# Patient Record
Sex: FEMALE | Race: OTHER | Employment: FULL TIME | URBAN - METROPOLITAN AREA
[De-identification: names, ages, dates, MRNs, and addresses within clinical notes are randomized per-mention and may not be internally consistent; named-entity substitution may affect disease eponyms.]

---

## 2019-12-15 ENCOUNTER — HOSPITAL ENCOUNTER (EMERGENCY)
Age: 29
Discharge: HOME | End: 2019-12-15

## 2019-12-15 VITALS
SYSTOLIC BLOOD PRESSURE: 94 MMHG | TEMPERATURE: 98.3 F | DIASTOLIC BLOOD PRESSURE: 52 MMHG | BODY MASS INDEX: 24.55 KG/M2 | HEIGHT: 61 IN | HEART RATE: 76 BPM | OXYGEN SATURATION: 100 % | WEIGHT: 130 LBS | RESPIRATION RATE: 18 BRPM

## 2019-12-15 DIAGNOSIS — H66.90 ACUTE OTITIS MEDIA, UNSPECIFIED OTITIS MEDIA TYPE: ICD-10-CM

## 2019-12-15 DIAGNOSIS — H20.9 IRITIS OF RIGHT EYE: Primary | ICD-10-CM

## 2019-12-15 PROCEDURE — 99283 EMERGENCY DEPT VISIT LOW MDM: CPT

## 2019-12-15 RX ORDER — TOBRAMYCIN AND DEXAMETHASONE 3; 1 MG/ML; MG/ML
1 SUSPENSION/ DROPS OPHTHALMIC
Qty: 5 ML | Refills: 0 | Status: SHIPPED | OUTPATIENT
Start: 2019-12-15 | End: 2019-12-25

## 2019-12-15 RX ORDER — AMOXICILLIN 500 MG/1
500 CAPSULE ORAL 2 TIMES DAILY
Qty: 14 CAPSULE | Refills: 0 | Status: SHIPPED | OUTPATIENT
Start: 2019-12-15 | End: 2019-12-22

## 2019-12-15 ASSESSMENT — ENCOUNTER SYMPTOMS
EYE DISCHARGE: 0
PHOTOPHOBIA: 0
EYE ITCHING: 1
RHINORRHEA: 0
ABDOMINAL PAIN: 0
CONSTIPATION: 0
NAUSEA: 0
VOMITING: 0
SORE THROAT: 0
COLOR CHANGE: 0
ABDOMINAL DISTENTION: 0
EYE REDNESS: 1
SHORTNESS OF BREATH: 0

## 2020-07-15 LAB
ABO, EXTERNAL RESULT: NORMAL
RH FACTOR, EXTERNAL RESULT: NORMAL

## 2020-07-20 LAB
C. TRACHOMATIS, EXTERNAL RESULT: NEGATIVE
N. GONORRHOEAE, EXTERNAL RESULT: NEGATIVE

## 2020-08-10 ENCOUNTER — HOSPITAL ENCOUNTER (OUTPATIENT)
Dept: ULTRASOUND IMAGING | Age: 30
Discharge: HOME | End: 2020-08-12
Payer: MEDICAID

## 2020-08-10 PROCEDURE — 76817 TRANSVAGINAL US OBSTETRIC: CPT

## 2020-08-10 PROCEDURE — 76801 OB US < 14 WKS SINGLE FETUS: CPT

## 2020-10-21 LAB
HEP B, EXTERNAL RESULT: NEGATIVE
RUBELLA TITER, EXTERNAL RESULT: NORMAL

## 2020-10-22 ENCOUNTER — HOSPITAL ENCOUNTER (OUTPATIENT)
Dept: ULTRASOUND IMAGING | Age: 30
Discharge: HOME | End: 2020-10-24
Payer: MEDICARE

## 2020-10-22 PROCEDURE — 76805 OB US >/= 14 WKS SNGL FETUS: CPT

## 2020-10-22 PROCEDURE — 76817 TRANSVAGINAL US OBSTETRIC: CPT

## 2020-10-27 LAB
HIV, EXTERNAL RESULT: NEGATIVE
RPR, EXTERNAL RESULT: NONREACTIVE

## 2020-12-16 LAB
GLUCOSE, 1HR PP: 219 MG/DL (ref 60–140)
HCT VFR BLD CALC: 36.6 % (ref 37–47)
HEMOGLOBIN: 12.6 G/DL (ref 12–16)
MCH RBC QN AUTO: 33 PG (ref 27–31.3)
MCHC RBC AUTO-ENTMCNC: 34.4 % (ref 33–37)
MCV RBC AUTO: 96.2 FL (ref 82–100)
PDW BLD-RTO: 13.2 % (ref 11.5–14.5)
PLATELET # BLD: 177 K/UL (ref 130–400)
RBC # BLD: 3.8 M/UL (ref 4.2–5.4)
WBC # BLD: 9 K/UL (ref 4.8–10.8)

## 2020-12-20 LAB — PROGESTERONE, LC/MS/MS: 76.56 NG/ML

## 2020-12-23 RX ORDER — LANCETS 28 GAUGE
1 EACH MISCELLANEOUS
Qty: 100 EACH | Refills: 5 | Status: ON HOLD | OUTPATIENT
Start: 2020-12-23 | End: 2021-03-09 | Stop reason: HOSPADM

## 2020-12-23 RX ORDER — BLOOD-GLUCOSE METER
1 KIT MISCELLANEOUS DAILY
Qty: 1 KIT | Refills: 0 | Status: SHIPPED | OUTPATIENT
Start: 2020-12-23 | End: 2020-12-23

## 2020-12-23 RX ORDER — GLUCOSAMINE HCL/CHONDROITIN SU 500-400 MG
CAPSULE ORAL
Qty: 100 STRIP | Refills: 11 | Status: ON HOLD | OUTPATIENT
Start: 2020-12-23 | End: 2021-03-09 | Stop reason: HOSPADM

## 2020-12-23 RX ORDER — BLOOD PRESSURE TEST KIT
1 KIT MISCELLANEOUS
Qty: 100 EACH | Refills: 5 | Status: ON HOLD | OUTPATIENT
Start: 2020-12-23 | End: 2021-03-09 | Stop reason: HOSPADM

## 2020-12-23 RX ORDER — BLOOD-GLUCOSE METER
1 KIT MISCELLANEOUS DAILY
Qty: 1 KIT | Refills: 0 | Status: ON HOLD | OUTPATIENT
Start: 2020-12-23 | End: 2021-03-09 | Stop reason: HOSPADM

## 2020-12-28 ENCOUNTER — HOSPITAL ENCOUNTER (OUTPATIENT)
Dept: DIABETES SERVICES | Age: 30
Setting detail: THERAPIES SERIES
Discharge: HOME | End: 2020-12-28
Payer: MEDICARE

## 2020-12-28 VITALS — WEIGHT: 164.8 LBS | BODY MASS INDEX: 31.14 KG/M2

## 2020-12-28 PROCEDURE — G0108 DIAB MANAGE TRN  PER INDIV: HCPCS

## 2020-12-28 NOTE — PROGRESS NOTES
Diabetes Education Record- Gestational Diabetes   Also see Diabetic Screening    Patient, Dasha Millan,  here for diabetes self-management education of gestational diabetes. Estimated Date of Delivery: March 10, 2021  Today's visit was in an individual setting. Goals setting:  Goal Set with Patient  [x]Yes  [] NO  (SEE  EDUCATION AND GOALS)    MEDICAL HISTORY:  No past medical history on file. No family history on file. Patient has no known allergies. There is no immunization history on file for this patient. Current Medications  Current Outpatient Medications   Medication Sig Dispense Refill    Aimsco Ultra Thin Lancets MISC 1 box by Does not apply route 4 times daily (after meals and at bedtime) 100 each 5    blood glucose monitor strips by Other route 4 times daily (after meals and at bedtime) Test 4 times a day & as needed for symptoms of irregular blood glucose. 100 strip 11    Alcohol Swabs PADS 1 box by Does not apply route 4 times daily (before meals and nightly) 100 each 5    glucose monitoring kit (FREESTYLE) monitoring kit 1 kit by Does not apply route daily Test AC, HS and PRN Please dispense what insurance covers 1 kit 0     No current facility-administered medications for this encounter.    :     Comments:  Allergies:  No Known Allergies    Diabetes 5  / Health Status    A1C blood level - at goal < 7%   No results found for: LABA1C  Lab Results   Component Value Date    CREATININE 0.67 04/29/2014       Blood pressure (140/90)  Or less  BP Readings from Last 3 Encounters:   12/15/19 (!) 94/52        Cholesterol ( LDL under  100)   No results found for: LDLCALC, LDLCHOLESTEROL, LDLDIRECT    4 . Smoking ? []Yes   [x]No    5. Taking an Asprin daily?   []Yes   [x]No            Diabetes Self- Management Education Record    Participant Name: Dasha Millan  Referring Provider: ERON Russell CNP   Assessment/Evaluation Ratings:  1=Needs Instruction   4=Demonstrates Understanding/Competency  2=Needs Review   NC=Not Covered    3=Comprehends Key Points  N/A=Not Applicable    Topics/Learning Objectives Initial Assessment Date:   Instr. Date Reinforce Date Post- session Eval Comments   Diabetes disease process & Treatment process: Define diabetes & pre-diabetes; Identify own type of diabetes; role of the pancreas; signs/symptoms; diagnostic criteria; prevention & treatment options; contributing factors. 12/28/20  Sybil Jimenes RN/  1    12/28/20  Pt's mom has diabetes type 2. Somewhat knowledgeable of process. Reviewed all and verbalized understanding. Sybil Jimenes RN     Incorporating nutritional management into lifestyle: Describe effect of type, amount & timing of food on blood glucose; Describe basic meal planning techniques & current nutrition guideline   12/28/20  Sybil Jimenes RN/  1    12/28/20  Reviewed carb/protein/fats and effects on glucose. Unaware of carbs in diet. Advised of Label reading and [facility] jennifer to assist with carb knowledge. Reviewed diet menu for today. Discussed which were carbs and advised on amounts to eat Will review with dietitian in greater detail. Sybil Jimenes RN     Correctly read food labels & demonstrate CHO counting & portion control with personalized meal plan. Identify dining out strategies, & dietary sick day guidelines. Incorporating physical activity into lifestyle:   Verbalize effect of exercise on blood glucose levels; benefits of regular exercise; safety considerations; contraindications; maintenance of activity. 12/28/20  Sybil Jimenes RN/  1    12/28/20  Reviewed GDM plan for activity - walking reinforced in short bouts. Sybil Jimenes RN     Using medications safely:  Identify effects of diabetes medicines on blood glucose levels; List diabetes medication taken, action & side effects;    12/28/20  Sybil Jimenes RN/  1    12/28/20  Not on meds at this time.  Advised about insulin and uses during Plan:  Follow-up plan:     [] MNT referral request    [x]  Appointment with Dietician planned for  1 week Virtual 12/29/20     [] [address] [place] [address] [phone]     [] Fit Walks : [address] [place] [address] or [address] [place] [address]      []   Emotional Support      [] Kj on phone      []  [facility]    []  [facility]    []  Other ___________________________      Post Education Referrals:      [] [facility] information sheet and [address] [facility] , [address]       [] Dental care    [] Podiatrist     []  Opthamologist      []Other    Roge Alex RN

## 2021-01-06 ENCOUNTER — TELEPHONE (OUTPATIENT)
Dept: DIABETES SERVICES | Age: 31
End: 2021-01-06

## 2021-01-06 NOTE — PROGRESS NOTES
Diabetes Education Record- Gestational Diabetes   Also see Diabetic Screening    Patient, Nori Aguilar,  Contacted via phone for diabetes self-management education of gestational diabetes. Estimated Date of Delivery: March 10, 2021  Today's visit was on the phone     Goals setting:  Goal Set with Patient  [x]Yes  [] NO  (SEE  EDUCATION AND GOALS)    MEDICAL HISTORY:  No past medical history on file. No family history on file. Patient has no known allergies. There is no immunization history on file for this patient. Current Medications  Current Outpatient Medications   Medication Sig Dispense Refill    Aimsco Ultra Thin Lancets MISC 1 box by Does not apply route 4 times daily (after meals and at bedtime) 100 each 5    blood glucose monitor strips by Other route 4 times daily (after meals and at bedtime) Test 4 times a day & as needed for symptoms of irregular blood glucose. 100 strip 11    Alcohol Swabs PADS 1 box by Does not apply route 4 times daily (before meals and nightly) 100 each 5    glucose monitoring kit (FREESTYLE) monitoring kit 1 kit by Does not apply route daily Test AC, HS and PRN Please dispense what insurance covers 1 kit 0     No current facility-administered medications for this encounter.    :     Comments:  Allergies:  No Known Allergies    Diabetes 5  / Health Status    A1C blood level - at goal < 7%   No results found for: LABA1C  Lab Results   Component Value Date    CREATININE 0.67 04/29/2014       Blood pressure (140/90)  Or less  BP Readings from Last 3 Encounters:   12/15/19 (!) 94/52        Cholesterol ( LDL under  100)   No results found for: LDLCALC, LDLCHOLESTEROL, LDLDIRECT    4 . Smoking ? []Yes   [x]No    5. Taking an Asprin daily?   []Yes   [x]No            Diabetes Self- Management Education Record    Participant Name: Nori Aguilar  Referring Provider: ERON Kenney CNP   Assessment/Evaluation Ratings:  1=Needs Instruction   4=Demonstrates Understanding/Competency  2=Needs Review   NC=Not Covered    3=Comprehends Key Points  N/A=Not Applicable    Topics/Learning Objectives Initial Assessment Date:   Instr. Date Reinforce Date Post- session Eval Comments   Diabetes disease process & Treatment process: Define diabetes & pre-diabetes; Identify own type of diabetes; role of the pancreas; signs/symptoms; diagnostic criteria; prevention & treatment options; contributing factors. 12/28/20  Abhijit Garcia RN/  1    12/28/20  Pt's mom has diabetes type 2. Somewhat knowledgeable of process. Reviewed all and verbalized understanding. Abhijit Garcia RN     Incorporating nutritional management into lifestyle: Describe effect of type, amount & timing of food on blood glucose; Describe basic meal planning techniques & current nutrition guideline   12/28/20  Abhijit Garcia RN/  1 01/06/21  Abhijit Garcia RN     12/28/20  Reviewed carb/protein/fats and effects on glucose. Unaware of carbs in diet. Advised of Label reading and [employer] jennifer to assist with carb knowledge. Reviewed diet menu for today. Discussed which were carbs and advised on amounts to eat Will review with dietitian in greater detail. Abhijit Garcia RN  01/06/21  Reinforced Above. Pt was told by OB/GYN that she will have to go on insulin next week if no improvement with BG. Pt very receptive to advice given today. Reviewed breakfast today and was eating eggs and blair only as she is afraid to eat any carbs. Advised to follow 45 gm carbs per meal with 15-30 gms carbs snacks until virtual visit with dietitian appt in 2 days. Will keep a food log. Reviewed how to read label. Asked about whole wheat vs white bread and informed of difference and effects on glucose. Feels she is eating too many carbs at dinner. Verbalized that she now has a better understanding and will keep a food log with carb count for dietitian on Friday.  Abhijit Garcia RN       Correctly read food labels & demonstrate CHO counting & portion control with personalized meal plan. Identify dining out strategies, & dietary sick day guidelines. Incorporating physical activity into lifestyle:   Verbalize effect of exercise on blood glucose levels; benefits of regular exercise; safety considerations; contraindications; maintenance of activity. 12/28/20  Sonam Shearer RN/  1 01/06/21  Sonam Shearer RN     12/28/20  Reviewed GDM plan for activity - walking reinforced in short bouts. Sonam Shearer RN  01/06/21  Reviewed how exercise affects glucose. Sonam Shearer RN     Using medications safely:  Identify effects of diabetes medicines on blood glucose levels; List diabetes medication taken, action & side effects;    12/28/20  Sonam Shearer RN/  1    12/28/20  Not on meds at this time. Advised about insulin and uses during pregnancy. Verbalized understanding. Sonam Shearer RN     Insulin / Injectable - Appropriate injection sites; proper storage; supplies needed; proper technique; safe needle disposal guidelines. Monitoring blood glucose, interpreting and using results:  Identify recommended & personal blood glucose targets; importance of testing; testing supplies; HgbA1C target levels; Factors affecting blood glucose; Importance of logging blood glucose levels for pattern recognition; ketone testing; safe lancet disposal.   12/28/20  Sonam Shearer RN/  1 01/06/21  Sonam Shearer RN     12/28/20  Reviewed plan for checking BG fasting and 2 hours PP daily. BG checked today 99  Reviewed Meter use Sonam Shearer RN  01/06/21  BG too  High according to ob /gyn  OB doc wants her to do after meal BG only. Advised to do 2 hours after meal. Sonam Shearer RN      Prevention, detection & treatment of acute complications:  Identify symptoms of hyper & hypoglycemia, and prevention & treatment strategies. 12/28/20  Sonam Shearer RN/  1    12/28/20  Reviewed Hypo and hyper symptoms. Pt has had both.  Reviewed treatment plan. Will try to carry food sources with her to prevent lows. Shobha Lorenzo RN     Describe sick day guidelines & indications for physician notification. Identify short term consequences of poor control. 12/28/20  Shobha Lorenzo RN/  1    12/28/20  Reviewed effects of stress/sickness on glucose levels Shobha Lorenzo RN     Prevention, detection & treatment of chronic complications:  Define the natural course of diabetes & describe the relationship of blood glucose levels to long term complications of diabetes. Identify preventative measures & standards of care. 12/28/20  Shobha Lorenzo RN/  1    12/28/20  Risk of type 2 DM for mother post pregnancy. Risks of high BG for Mom and baby reviewed. Shobha Lorenzo RN     Developing strategies to address psychosocial issues:  Describe feelings about living with diabetes; Describe how stress, depression & anxiety affect blood glucose; Identify coping strategies; Identify support needed & support network available. 12/28/20  Shobha Lorenzo RN/  1    12/28/20  Verbalized feelings of being overwhelmed. Support given. Shobha Lorenzo RN     Developing strategies to promote health/change behavior: Identify 7 self-care behaviors; Personal health risk factors; Benefits, challenges & strategies for behavioral change;    12/28/20  Shobha Lorenzo RN/  1    12/28/20  Understands self-car behaviors. Will need reinforcement at future date. Shobha Lorenzo RN       Individualized goal selection. 12/28/20  Shobha Lorenzo RN/       My goal , to help me improve my health, I will:     1. Find out what foods are carbs and try to eat within guidelines. 01/06/21  25% of the time   Shobha Lorenzo RN    2. Keep food log today and tomorrow and share at dietitian appt tomorrow   01/06/21  0%  Shobha Lorenzo RN    3.  Check BG fasting and 2 hour pp meals , record and take to follow -up appointments 01/06/21  50%  Shobha Lorenzo RN         Instruction Method: [x]Lecture/Discussion  [] Gestational Conversation Map [x]Handouts  [x]Return Demonstration      Education Materials/Equipment Provided:    [x] Resource materials provided today included self care handouts, BG Guidelines, Rule of 15, S/S of high and low BG.     [x]Glucose Meter Pt brought her meter and demonstrated how to use. []Insulin Kit     []Other          Encounter Type Date Start Time End Time Comments No Show Dates   Assessment   12/28/20  Anupam Torres RN   2:00 pm 3:30 12/28/20  Pt overwhelmed by instructions. Will review at home and ask more questions at virtual appointment with dietitian tomorrow night @ 7 pm.  Can contact Diabetes Education also. Will follow up in 1 week. Downloaded [employer] today in office. Is ready for virtual appointment tomorrow. Anupam Torres RN      Dietitian Session         1- 2 Week Follow-up 01/06/21  Anupam Torres RN       []In Person  [x]Telephone  01/06/21  Pt very motivated today to talk about diet. Appt made with dietitian for Friday @ 1 pm virtually. Reviewed concepts today and patient verbalized more understanding. Reviewed meals and how to eat protein and carbs with every meal and snack and why. Verbalized understanding. Will follow up in 1-2 weeks via phone   Anupam Torres RN        Meter Instrx 12/28/20  Anupam Torres RN     12/28/20  Pt demonstrated a test today in office. Reviewed salient points. Successful demonstration.   Anupam Torres RN      Insulin Instrx      []Pen  []Vial & Syringe      DSMS Support Plan:  Follow-up plan:     [] MNT referral request    [x]  Appointment with Dietician planned for  2-3 weeks     [] [address] [place] [address] [phone]     [] Fit Walks : [address] [facility] or [address] [place] [address]      []   Emotional Support      [] Kj on phone      []  [facility]    []  [facility]    []  Other ___________________________      Post Education Referrals:      [] [facility] information sheet and [address] [facility] , [phone] [phone]      [] Dental care    [] Podiatrist     []  Opthamologist      []Other    Greer August, CBN, RN, [facility]

## 2021-01-08 ENCOUNTER — HOSPITAL ENCOUNTER (OUTPATIENT)
Dept: DIABETES SERVICES | Age: 31
Discharge: HOME | End: 2021-01-08

## 2021-01-08 ENCOUNTER — HOSPITAL ENCOUNTER (OUTPATIENT)
Age: 31
Discharge: HOME | End: 2021-01-08
Payer: MEDICARE

## 2021-01-08 PROCEDURE — 97802 MEDICAL NUTRITION INDIV IN: CPT

## 2021-01-08 NOTE — PROGRESS NOTES
Diabetes Education Record- Gestational Diabetes   Also see Diabetic Screening    Patient, Yadira Ruby, seen for medical nutrition therapy of gestational diabetes. Estimated Date of Delivery: March 10, 2021    Yadira Ruby is a 27 y.o. female being evaluated by a Virtual Visit (video visit) encounter to address concerns as mentioned above. A caregiver was present when appropriate. Due to this being a TeleHealth encounter (During RSK-02 public health emergency), evaluation of the following organ systems was limited: Vitals/Constitutional/EENT/Resp/CV/GI//MS/Neuro/Skin/Heme-Lymph-Imm. Pursuant to the emergency declaration under the [address] [place] [address], [address] [place] [address] authority and the [employer] Act, this Virtual Visit was conducted with patient's (and/or legal guardian's) consent, to reduce the patient's risk of exposure to COVID-19 and provide necessary medical care. The patient (and/or legal guardian) has also been advised to contact this office for worsening conditions or problems, and seek emergency medical treatment and/or call 911 if deemed necessary. Patient identification was verified at the start of the visit: yes    Total time spent for this encounter: 60min    Services were provided through a video synchronous discussion virtually to substitute for in-person clinic visit. Patient and provider were located at their individual homes. --Yvonne Mancini RD, LD on 1/8/2021 at 2:22 PM    An electronic signature was used to authenticate this note. Goals setting:  Goal Set with Patient  [x]Yes  [] NO  (SEE  EDUCATION AND GOALS)    MEDICAL HISTORY:  No past medical history on file. No family history on file. Patient has no known allergies. There is no immunization history on file for this patient.     Current Medications  Current Outpatient Medications   Medication Sig Dispense Refill    Aimsco Ultra Thin Lancets MISC 1 box by Does not apply route 4 times daily (after meals and at bedtime) 100 each 5    blood glucose monitor strips by Other route 4 times daily (after meals and at bedtime) Test 4 times a day & as needed for symptoms of irregular blood glucose. 100 strip 11    Alcohol Swabs PADS 1 box by Does not apply route 4 times daily (before meals and nightly) 100 each 5    glucose monitoring kit (FREESTYLE) monitoring kit 1 kit by Does not apply route daily Test AC, HS and PRN Please dispense what insurance covers 1 kit 0     No current facility-administered medications for this encounter.    :     Comments:  Allergies:  No Known Allergies    Diabetes 5  / Health Status    A1C blood level - at goal < 7%   No results found for: LABA1C  Lab Results   Component Value Date    CREATININE 0.67 04/29/2014       Blood pressure (140/90)  Or less  BP Readings from Last 3 Encounters:   12/15/19 (!) 94/52        Cholesterol ( LDL under  100)   No results found for: LDLCALC, LDLCHOLESTEROL, LDLDIRECT    4 . Smoking ? []Yes   [x]No    5. Taking an Asprin daily? []Yes   [x]No            Diabetes Self- Management Education Record    Participant Name: Catherine Guy  Referring Provider: ERON Saunders CNP   Assessment/Evaluation Ratings:  1=Needs Instruction   4=Demonstrates Understanding/Competency  2=Needs Review   NC=Not Covered    3=Comprehends Key Points  N/A=Not Applicable    Topics/Learning Objectives Initial Assessment Date:   Instr. Date Reinforce Date Post- session Eval Comments   Diabetes disease process & Treatment process: Define diabetes & pre-diabetes; Identify own type of diabetes; role of the pancreas; signs/symptoms; diagnostic criteria; prevention & treatment options; contributing factors. 12/28/20  Coral Kawasaki, RN/  1    12/28/20  Pt's mom has diabetes type 2. Somewhat knowledgeable of process. Reviewed all and verbalized understanding.  Coral Kawasaki, RN     Incorporating nutritional management into lifestyle: Describe effect of type, amount & timing of food on blood glucose; Describe basic meal planning techniques & current nutrition guideline   12/28/20  Nohemi Salcido RN/  1 1/8/21 Shanice Yang RDN, SKYE   12/28/20  Reviewed carb/protein/fats and effects on glucose. Unaware of carbs in diet. Advised of Label reading and Plan B Acqusitions jennifer to assist with carb knowledge. Reviewed diet menu for today. Discussed which were carbs and advised on amounts to eat Will review with dietitian in greater detail. Nohemi Salcido RN    1/8/21  She uses carb counting for diabetes book and has been self educating about carbohydrates. Discussed meal planning and snacks. How to read nutrition facts label. Shanice Yang RDN     Correctly read food labels & demonstrate CHO counting & portion control with personalized meal plan. Identify dining out strategies, & dietary sick day guidelines. 1/8/21 Shanice Yang RDN, SKYE   Provided guidance on personalized meal plan and carbohydrate amounts. Staying consistent with carbohydrates throughout the day. 60g carbohydrates at meals and 30g carbohydrate at snacks. Provided examples for all meals and snacks. Incorporating physical activity into lifestyle:   Verbalize effect of exercise on blood glucose levels; benefits of regular exercise; safety considerations; contraindications; maintenance of activity. 12/28/20  Nohemi Salcido RN/  1 1/8/21 12/28/20  Reviewed GDM plan for activity - walking reinforced in short bouts. Nohemi Salcido RN    1/8/21 Reinforced walking for activity Shanice Yang RDN, SKYE   Using medications safely:  Identify effects of diabetes medicines on blood glucose levels; List diabetes medication taken, action & side effects;    12/28/20  Nohemi Salcido RN/  1    12/28/20  Not on meds at this time. Advised about insulin and uses during pregnancy. Verbalized understanding.  Nohemi Salcido RN     Insulin / Injectable - Appropriate injection sites; proper storage; supplies needed; proper technique; safe needle disposal guidelines. Monitoring blood glucose, interpreting and using results:  Identify recommended & personal blood glucose targets; importance of testing; testing supplies; HgbA1C target levels; Factors affecting blood glucose; Importance of logging blood glucose levels for pattern recognition; ketone testing; safe lancet disposal.   12/28/20  Padmini Douglas RN/  1    12/28/20  Reviewed plan for checking BG fasting and 2 hours PP daily. BG checked today 99  Reviewed Meter use Padmini Douglas RN     Prevention, detection & treatment of acute complications:  Identify symptoms of hyper & hypoglycemia, and prevention & treatment strategies. 12/28/20  Padmini Douglas RN/  1    12/28/20  Reviewed Hypo and hyper symptoms. Pt has had both. Reviewed treatment plan. Will try to carry food sources with her to prevent lows. Padmini Douglas RN     Describe sick day guidelines & indications for physician notification. Identify short term consequences of poor control. 12/28/20  Padmini Douglas RN/  1    12/28/20  Reviewed effects of stress/sickness on glucose levels Padmini Douglas RN     Prevention, detection & treatment of chronic complications:  Define the natural course of diabetes & describe the relationship of blood glucose levels to long term complications of diabetes. Identify preventative measures & standards of care. 12/28/20  Padmini Douglas RN/  1    12/28/20  Risk of type 2 DM for mother post pregnancy. Risks of high BG for Mom and baby reviewed. Padmini Douglas RN     Developing strategies to address psychosocial issues:  Describe feelings about living with diabetes; Describe how stress, depression & anxiety affect blood glucose; Identify coping strategies; Identify support needed & support network available. 12/28/20  Padmini Douglas RN/  1    12/28/20  Verbalized feelings of being overwhelmed. Support given.  Jessee Soliz Nikolai Fontanez RN     Developing strategies to promote health/change behavior: Identify 7 self-care behaviors; Personal health risk factors; Benefits, challenges & strategies for behavioral change;    12/28/20  Shobha Lorenzo RN/  1    12/28/20  Understands self-car behaviors. Will need reinforcement at future date. Shobha Lorenzo RN       Individualized goal selection. 12/28/20  Shobha Lorenzo RN/       My goal , to help me improve my health, I will:     1. Find out what foods are carbs and try to eat within guidelines. 2. Keep food log today and tomorrow and share at dietitian appt tomorrow     3. Check BG fasting and 2 hour pp meals , record and take to follow -up appointments         Instruction Method: [x]Lecture/Discussion  [] Gestational Conversation Map [x]Handouts  [x]Return Demonstration      Education Materials/Equipment Provided:    [x] Resource materials provided today included self care handouts, BG Guidelines, Rule of 15, S/S of high and low BG.     [x]Glucose Meter Pt brought her meter and demonstrated how to use. []Insulin Kit     []Other          Encounter Type Date Start Time End Time Comments No Show Dates   Assessment   12/28/20  Shobha Lorenzo RN   2:00 pm 3:30 12/28/20  Pt overwhelmed by instructions. Will review at home and ask more questions at virtual appointment with dietitian tomorrow night @ 7 pm.  Can contact Diabetes Education also. Will follow up in 1 week. Downloaded [employer] today in office. Is ready for virtual appointment tomorrow. Shobha Lorenzo RN      Dietitian Session 1/8/21 Doug Roberson RDN, LD 1:00pm  2:00pm  1/8/21   Reviewed carbohydrate containing foods. Provided guidance on number of carbohydrates for each meal and snack. She felt more confident during visit and was able to describe her diet and number of carbs generally consumed. Encouraged breastfeeding.           1- 2 Week Follow-up      []In Person  []Telephone    Meter Instrx 12/28/20  Shobha Lorenzo RN     12/28/20  Pt demonstrated a test today in office. Reviewed salient points. Successful demonstration.   Shyanne Oscar RN      Insulin Instrx      []Pen  []Vial & Syringe      DSMS Support Plan:  Follow-up plan:     [] MNT referral request    [x]  Appointment with Dietician planned for  1 week Virtual 12/29/20     [] [address] [place] [address] [phone]     [] Fit Walks : [address] [place] [address] or [address] [place] [address]      []   Emotional Support      [] Kj on phone      []  [facility]    []  [facility]    []  Other ___________________________      Post Education Referrals:      [] [facility] information sheet and [address] [facility] , 21 [phone]      [] Dental care    [] Podiatrist     []  Opthamologist      []Other    SHAILA Bolivar, RDN, LD

## 2021-01-28 ENCOUNTER — HOSPITAL ENCOUNTER (OUTPATIENT)
Dept: ULTRASOUND IMAGING | Age: 31
Discharge: HOME | End: 2021-01-30
Payer: MEDICARE

## 2021-01-28 ENCOUNTER — TELEPHONE (OUTPATIENT)
Dept: DIABETES SERVICES | Age: 31
End: 2021-01-28

## 2021-01-28 DIAGNOSIS — Z34.83 ENCOUNTER FOR SUPERVISION OF OTHER NORMAL PREGNANCY IN THIRD TRIMESTER: ICD-10-CM

## 2021-01-28 PROCEDURE — 76815 OB US LIMITED FETUS(S): CPT

## 2021-01-28 NOTE — PROGRESS NOTES
Diabetes Education Record- Gestational Diabetes   Also see Diabetic Screening    Patient, Wesly Driscoll,  Contacted via phone for diabetes self-management education of gestational diabetes. Estimated Date of Delivery: March 10, 2021  Today's visit was on the phone     Goals setting:  Goal Set with Patient  [x]Yes  [] NO  (SEE  EDUCATION AND GOALS)    MEDICAL HISTORY:  No past medical history on file. No family history on file. Patient has no known allergies. There is no immunization history on file for this patient. Current Medications  Current Outpatient Medications   Medication Sig Dispense Refill    Aimsco Ultra Thin Lancets MISC 1 box by Does not apply route 4 times daily (after meals and at bedtime) 100 each 5    blood glucose monitor strips by Other route 4 times daily (after meals and at bedtime) Test 4 times a day & as needed for symptoms of irregular blood glucose. 100 strip 11    Alcohol Swabs PADS 1 box by Does not apply route 4 times daily (before meals and nightly) 100 each 5    glucose monitoring kit (FREESTYLE) monitoring kit 1 kit by Does not apply route daily Test AC, HS and PRN Please dispense what insurance covers 1 kit 0     No current facility-administered medications for this encounter.    :     Comments:  Allergies:  No Known Allergies    Diabetes 5  / Health Status    A1C blood level - at goal < 7%   No results found for: LABA1C  Lab Results   Component Value Date    CREATININE 0.67 04/29/2014       Blood pressure (140/90)  Or less  BP Readings from Last 3 Encounters:   12/15/19 (!) 94/52        Cholesterol ( LDL under  100)   No results found for: LDLCALC, LDLCHOLESTEROL, LDLDIRECT    4 . Smoking ? []Yes   [x]No    5. Taking an Asprin daily?   []Yes   [x]No            Diabetes Self- Management Education Record    Participant Name: Wesly Driscoll  Referring Provider: ERON Dean CNP   Assessment/Evaluation Ratings:  1=Needs Instruction   4=Demonstrates counting & portion control with personalized meal plan. Identify dining out strategies, & dietary sick day guidelines. Incorporating physical activity into lifestyle:   Verbalize effect of exercise on blood glucose levels; benefits of regular exercise; safety considerations; contraindications; maintenance of activity. 12/28/20  Madina Back RN/  1 01/06/21  Madina Back RN     12/28/20  Reviewed GDM plan for activity - walking reinforced in short bouts. Madina Back RN  01/06/21  Reviewed how exercise affects glucose. Madina Back RN     Using medications safely:  Identify effects of diabetes medicines on blood glucose levels; List diabetes medication taken, action & side effects;    12/28/20  Madina Back RN/  1    12/28/20  Not on meds at this time. Advised about insulin and uses during pregnancy. Verbalized understanding. Madina Back RN     Insulin / Injectable - Appropriate injection sites; proper storage; supplies needed; proper technique; safe needle disposal guidelines. Monitoring blood glucose, interpreting and using results:  Identify recommended & personal blood glucose targets; importance of testing; testing supplies; HgbA1C target levels; Factors affecting blood glucose; Importance of logging blood glucose levels for pattern recognition; ketone testing; safe lancet disposal.   12/28/20  Madina Back RN/  1 01/06/21  Madina Back RN     12/28/20  Reviewed plan for checking BG fasting and 2 hours PP daily. BG checked today 99  Reviewed Meter use Madina Back RN  01/06/21  BG too  High according to ob /gyn  OB doc wants her to do after meal BG only. Advised to do 2 hours after meal. Madina Back RN      Prevention, detection & treatment of acute complications:  Identify symptoms of hyper & hypoglycemia, and prevention & treatment strategies. 12/28/20  Madina Back RN/  1    12/28/20  Reviewed Hypo and hyper symptoms. Pt has had both.  Reviewed treatment plan. Will try to carry food sources with her to prevent lows. Laura Acosta RN     Describe sick day guidelines & indications for physician notification. Identify short term consequences of poor control. 12/28/20  aLura Acosta RN/  1    12/28/20  Reviewed effects of stress/sickness on glucose levels Laura Acosta RN     Prevention, detection & treatment of chronic complications:  Define the natural course of diabetes & describe the relationship of blood glucose levels to long term complications of diabetes. Identify preventative measures & standards of care. 12/28/20  Laura Acosta RN/  1    12/28/20  Risk of type 2 DM for mother post pregnancy. Risks of high BG for Mom and baby reviewed. Laura Acosta RN     Developing strategies to address psychosocial issues:  Describe feelings about living with diabetes; Describe how stress, depression & anxiety affect blood glucose; Identify coping strategies; Identify support needed & support network available. 12/28/20  Laura Acosta RN/  1    12/28/20  Verbalized feelings of being overwhelmed. Support given. Laura Acosta RN     Developing strategies to promote health/change behavior: Identify 7 self-care behaviors; Personal health risk factors; Benefits, challenges & strategies for behavioral change;    12/28/20  Laura Acosta RN/  1    12/28/20  Understands self-car behaviors. Will need reinforcement at future date. Laura Acosta RN       Individualized goal selection. 12/28/20  Laura Acosta RN/       My goal , to help me improve my health, I will:     1. Find out what foods are carbs and try to eat within guidelines. 01/06/21  25% of the time   Laura Acosta RN    2. Keep food log today and tomorrow and share at dietitian appt tomorrow   01/06/21  0%  Laura Acosta RN    3.  Check BG fasting and 2 hour pp meals , record and take to follow -up appointments 01/06/21  50%  Laura Acosta RN         Instruction Method: preference for progress report. Also knows to call if questions Ilene Sapp RN      Meter Instrx 12/28/20  Ilene Sapp RN     12/28/20  Pt demonstrated a test today in office. Reviewed salient points. Successful demonstration.   Ilene Sapp RN      Insulin Instrx      []Pen  []Vial & Syringe      DSMS Support Plan:  Follow-up plan:     [] MNT referral request    [x]  Appointment with Dietician planned for  2-3 weeks     [] [address] [place] [address] [phone]     [] Fit Walks : [address] [place] [address] or [address] [place] [address]      []   Emotional Support      [] Kj on phone      []  [facility]    []  [facility] Mother[facility]    []  Other ___________________________      Post Education Referrals:      [] [address] [facility] information sheet and [address] [facility] , 21 [phone]      [] Dental care    [] Podiatrist     []  Opthamologist      []Other    ROB Rios, RN, Lisandra Sheppard

## 2021-02-10 LAB — GBS, EXTERNAL RESULT: NEGATIVE

## 2021-02-24 ENCOUNTER — HOSPITAL ENCOUNTER (OUTPATIENT)
Age: 31
Discharge: HOME | End: 2021-02-24
Attending: OBSTETRICS & GYNECOLOGY | Admitting: OBSTETRICS & GYNECOLOGY
Payer: MEDICARE

## 2021-02-24 ENCOUNTER — TELEPHONE (OUTPATIENT)
Dept: DIABETES SERVICES | Age: 31
End: 2021-02-24

## 2021-02-24 VITALS
SYSTOLIC BLOOD PRESSURE: 122 MMHG | RESPIRATION RATE: 16 BRPM | TEMPERATURE: 98.4 F | HEART RATE: 81 BPM | DIASTOLIC BLOOD PRESSURE: 77 MMHG

## 2021-02-24 LAB
ALBUMIN SERPL-MCNC: 3.3 G/DL (ref 3.5–4.6)
ALP BLD-CCNC: 312 U/L (ref 40–130)
ALT SERPL-CCNC: 17 U/L (ref 0–33)
ANION GAP SERPL CALCULATED.3IONS-SCNC: 10 MEQ/L (ref 9–15)
AST SERPL-CCNC: 20 U/L (ref 0–35)
BILIRUB SERPL-MCNC: <0.2 MG/DL (ref 0.2–0.7)
BUN BLDV-MCNC: 13 MG/DL (ref 6–20)
CALCIUM SERPL-MCNC: 9.1 MG/DL (ref 8.5–9.9)
CHLORIDE BLD-SCNC: 101 MEQ/L (ref 95–107)
CO2: 21 MEQ/L (ref 20–31)
CREAT SERPL-MCNC: 0.78 MG/DL (ref 0.5–0.9)
CREATININE URINE: 61.4 MG/DL
GFR AFRICAN AMERICAN: >60
GFR NON-AFRICAN AMERICAN: >60
GLOBULIN: 3 G/DL (ref 2.3–3.5)
GLUCOSE BLD-MCNC: 107 MG/DL (ref 70–99)
HCT VFR BLD CALC: 40.1 % (ref 37–47)
HEMOGLOBIN: 13.5 G/DL (ref 12–16)
MCH RBC QN AUTO: 31.2 PG (ref 27–31.3)
MCHC RBC AUTO-ENTMCNC: 33.8 % (ref 33–37)
MCV RBC AUTO: 92.3 FL (ref 82–100)
PDW BLD-RTO: 14.5 % (ref 11.5–14.5)
PLATELET # BLD: 149 K/UL (ref 130–400)
POTASSIUM SERPL-SCNC: 3.9 MEQ/L (ref 3.4–4.9)
PROTEIN PROTEIN: 10 MG/DL
RBC # BLD: 4.34 M/UL (ref 4.2–5.4)
SODIUM BLD-SCNC: 132 MEQ/L (ref 135–144)
TOTAL PROTEIN: 6.3 G/DL (ref 6.3–8)
URIC ACID, SERUM: 4.3 MG/DL (ref 2.4–5.7)
WBC # BLD: 9 K/UL (ref 4.8–10.8)

## 2021-02-24 PROCEDURE — 59025 FETAL NON-STRESS TEST: CPT

## 2021-02-24 PROCEDURE — 82570 ASSAY OF URINE CREATININE: CPT

## 2021-02-24 PROCEDURE — 84550 ASSAY OF BLOOD/URIC ACID: CPT

## 2021-02-24 PROCEDURE — 36415 COLL VENOUS BLD VENIPUNCTURE: CPT

## 2021-02-24 PROCEDURE — 80053 COMPREHEN METABOLIC PANEL: CPT

## 2021-02-24 PROCEDURE — 85027 COMPLETE CBC AUTOMATED: CPT

## 2021-02-24 NOTE — FLOWSHEET NOTE
Dr. Nieves Casas called and notified of pt's reactive NST and elevated BP (see flowsheet). New orders received for [address]. Pt updated on poc.

## 2021-02-24 NOTE — FLOWSHEET NOTE
Dr. Damian Toney called and updated on pt's lab results. States pt is okay to go home and follow up next Wednesday at the clinic. Pt updated on poc. Verbalized understanding.

## 2021-02-24 NOTE — PROGRESS NOTES
Diabetes Education Record- Gestational Diabetes   Also see Diabetic Screening    Patient, Lanette Zhang,  Contacted via phone for Follow up on diabetes self-management education of gestational diabetes. Estimated Date of Delivery: March 5, 2021  Today's visit was on the phone     Goals setting:  Goal Set with Patient  [x]Yes  [] NO  (SEE  EDUCATION AND GOALS)    MEDICAL HISTORY:  No past medical history on file. No family history on file. Patient has no known allergies. There is no immunization history on file for this patient. Current Medications  Current Outpatient Medications   Medication Sig Dispense Refill    Aimsco Ultra Thin Lancets MISC 1 box by Does not apply route 4 times daily (after meals and at bedtime) 100 each 5    blood glucose monitor strips by Other route 4 times daily (after meals and at bedtime) Test 4 times a day & as needed for symptoms of irregular blood glucose. 100 strip 11    Alcohol Swabs PADS 1 box by Does not apply route 4 times daily (before meals and nightly) 100 each 5    glucose monitoring kit (FREESTYLE) monitoring kit 1 kit by Does not apply route daily Test AC, HS and PRN Please dispense what insurance covers 1 kit 0     No current facility-administered medications for this encounter.    :     Comments:  Allergies:  No Known Allergies    Diabetes 5  / Health Status    A1C blood level - at goal < 7%   No results found for: LABA1C  Lab Results   Component Value Date    CREATININE 0.67 04/29/2014       Blood pressure (140/90)  Or less  BP Readings from Last 3 Encounters:   12/15/19 (!) 94/52        Cholesterol ( LDL under  100)   No results found for: LDLCALC, LDLCHOLESTEROL, LDLDIRECT    4 . Smoking ? []Yes   [x]No    5. Taking an Asprin daily?   []Yes   [x]No            Diabetes Self- Management Education Record    Participant Name: Lanette Zhang  Referring Provider: ERON Duncan CNP   Assessment/Evaluation Ratings:  1=Needs Instruction   4=Demonstrates Understanding/Competency  2=Needs Review   NC=Not Covered    3=Comprehends Key Points  N/A=Not Applicable    Topics/Learning Objectives Initial Assessment Date:   Instr. Date Reinforce Date Post- session Eval Comments   Diabetes disease process & Treatment process: Define diabetes & pre-diabetes; Identify own type of diabetes; role of the pancreas; signs/symptoms; diagnostic criteria; prevention & treatment options; contributing factors. 12/28/20  Braden Velasquez, RN/  1    12/28/20  Pt's mom has diabetes type 2. Somewhat knowledgeable of process. Reviewed all and verbalized understanding. Braden Velasquez RN 02/24/21  Pt contacted for f/u during her gestational diabetes. Pt had many questions and had just come back from OB appt. Had to go to the hospital as the baby failed the movement test and she was hypertensive. Went to hospital and states \"everything okay\" Braden Velasquez RN       Incorporating nutritional management into lifestyle: Describe effect of type, amount & timing of food on blood glucose; Describe basic meal planning techniques & current nutrition guideline   12/28/20  Braden Velasquez, RN/  1 01/06/21  Braden Velasquez, SHAILA   02/24/21  Braden Velasquez, SHAILA    12/28/20  Reviewed carb/protein/fats and effects on glucose. Unaware of carbs in diet. Advised of Label reading and Calorie Marvin Plush jennifer to assist with carb knowledge. Reviewed diet menu for today. Discussed which were carbs and advised on amounts to eat Will review with dietitian in greater detail. Braden Velasquez RN  01/06/21  Reinforced Above. Pt was told by OB/GYN that she will have to go on insulin next week if no improvement with BG. Pt very receptive to advice given today. Reviewed breakfast today and was eating eggs and blair only as she is afraid to eat any carbs. Advised to follow 45 gm carbs per meal with 15-30 gms carbs snacks until virtual visit with dietitian appt in 2 days. Will keep a food log.  Reviewed how to read label. Asked about whole wheat vs white bread and informed of difference and effects on glucose. Feels she is eating too many carbs at dinner. Verbalized that she now has a better understanding and will keep a food log with carb count for dietitian on Friday. Greer August RN  02/24/21  Pt had questions about BG after eating a fried chicken sandwich. Although less carbs than she can have for a meal, her BG was 130 - 2 hours after the meal (Pre meal was at goal). Advised of fat's role in slowing down absorption of carbs into blood stream and that high fat foods typically don't show up in bloodstream until 2-3 hours after eating. This would explain the higher reading. Verbalized understanding. Also states she saved her carbs up for tonight's meal. Reviewed timing of meals and importance of eating carbs with everymeal and not saving them up. Advised to eat recommended amount of carbs at supper and then eat a snack tonight of carbs and protein. Greer August RN       Correctly read food labels & demonstrate CHO counting & portion control with personalized meal plan. Identify dining out strategies, & dietary sick day guidelines. Incorporating physical activity into lifestyle:   Verbalize effect of exercise on blood glucose levels; benefits of regular exercise; safety considerations; contraindications; maintenance of activity. 12/28/20  Greer August RN/  1 01/06/21  Greer August RN   02/24/21  Greer August RN    12/28/20  Reviewed GDM plan for activity - walking reinforced in short bouts. Greer August RN  01/06/21  Reviewed how exercise affects glucose. Greer August RN  02/24/21  Pt has more pain with activity due to growing baby. Has limited walking. Advised to increase arms and legs movements while sitting and to walk as much as tolerated. Greer August RN     Using medications safely:  Identify effects of diabetes medicines on blood glucose levels;  List diabetes medication taken, action & side effects;    12/28/20  Ap Tesfaye RN/  1    12/28/20  Not on meds at this time. Advised about insulin and uses during pregnancy. Verbalized understanding. Ap Tesfaye RN     Insulin / Injectable - Appropriate injection sites; proper storage; supplies needed; proper technique; safe needle disposal guidelines. Monitoring blood glucose, interpreting and using results:  Identify recommended & personal blood glucose targets; importance of testing; testing supplies; HgbA1C target levels; Factors affecting blood glucose; Importance of logging blood glucose levels for pattern recognition; ketone testing; safe lancet disposal.   12/28/20  Ap Tesfaye RN/  1 01/06/21  Ap Tesfaye RN     12/28/20  Reviewed plan for checking BG fasting and 2 hours PP daily. BG checked today 99  Reviewed Meter use Ap Tesfaye RN  01/06/21  BG too  High according to ob /gyn  OB doc wants her to do after meal BG only. Advised to do 2 hours after meal. Ap Tesfaye RN  02/24/21  Checking most days before meals and bedtime. Had several low readings, one of 56. States she treated with cheesecake. Reinforced 15/15 rule for hypoglycemia and better choices for treatment. Reviewed effects of fat on carbs entering bloodstream. Ap Tesfaye RN        Prevention, detection & treatment of acute complications:  Identify symptoms of hyper & hypoglycemia, and prevention & treatment strategies. 12/28/20  Ap Tesfaye RN/  1 02/24/21  Ap Tesfaye RN     12/28/20  Reviewed Hypo and hyper symptoms. Pt has had both. Reviewed treatment plan. Will try to carry food sources with her to prevent lows. Ap Tesfaye RN     Describe sick day guidelines & indications for physician notification. Identify short term consequences of poor control.    12/28/20  Ap Tesfaye RN/  1    12/28/20  Reviewed effects of stress/sickness on glucose levels Ap Tesfaye RN     Prevention, detection & treatment and low BG.     [x]Glucose Meter Pt brought her meter and demonstrated how to use. []Insulin Kit     []Other          Encounter Type Date Start Time End Time Comments No Show Dates   Assessment   12/28/20  Nohemi Salcido RN   2:00 pm 3:30 12/28/20  Pt overwhelmed by instructions. Will review at home and ask more questions at virtual appointment with dietitian tomorrow night @ 7 pm.  Can contact Diabetes Education also. Will follow up in 1 week. Downloaded [employer] today in office. Is ready for virtual appointment tomorrow. Nohemi Salcido RN      Dietitian Session         1- 2 Week Follow-up 01/06/21  Nohemi Salcido RN       []In Person  [x]Telephone  01/06/21  Pt very motivated today to talk about diet. Appt made with dietitian for Friday @ 1 pm virtually. Reviewed concepts today and patient verbalized more understanding. Reviewed meals and how to eat protein and carbs with every meal and snack and why. Verbalized understanding. Will follow up in 1-2 weeks via phone   Nohemi Salcido RN    01/28/21  Contacted patient today for progress. BG 79 this morning and is doing better. Many at goal.  Exercised after one big meal when BG was 179 and brought it down to 102. Asked questions about Protein drink and \"net\" carbs. Advised to count total carbs and not Net carbs. Reason given. Talked about fiber and fat and results on BG Was dehydrated yesterday at [facility] appt. Did not drink or eat anything before. Advised to drink water as soon as she gets up and throughout day. Drinking more milk. Dietitian appointment was beneficial to her and she is trying to follow guidelines given. Has sonogram scheduled today. No further questions and will contact in 3 weeks per patient preference for progress report. Also knows to call if questions Nohemi Salcido RN  02/24/21  Patient appreciative of call and had many questions.  Reinforces consistent carb timing (every meal and not saving them up) Reinforced 15/15 rule as she had been having lows and not treating appropriately. Moved due date up to March 5. Will contact March 2 for follow up. Treasure Jorge RN      Meter Instrx 12/28/20  Treasure Jorge RN     12/28/20  Pt demonstrated a test today in office. Reviewed salient points. Successful demonstration.   Treasure Jorge RN      Insulin Instrx      []Pen  []Vial & Syringe      DSMS Support Plan:  Follow-up plan:     [] MNT referral request    [x]  Appointment with Dietician planned for  2-3 weeks     [] [address] [place] [address] [phone]     [] [facility] : [address] [place] [address] or [address] [place] [address]      []   Emotional Support      [] Kj on phone      []  [address]    []  [facility]    []  Other ___________________________      Post Education Referrals:      [] [address] [place] [address] information sheet and [address] [facility] , [address] [phone]      [] Dental care    [] Podiatrist     []  Opthamologist      []Other    ROB Almodovar, RN, [facility]

## 2021-03-03 ENCOUNTER — NURSE ONLY (OUTPATIENT)
Dept: PRIMARY CARE CLINIC | Age: 31
End: 2021-03-03

## 2021-03-04 LAB — SARS-COV-2, PCR: NOT DETECTED

## 2021-03-06 ENCOUNTER — HOSPITAL ENCOUNTER (INPATIENT)
Age: 31
LOS: 3 days | Discharge: HOME | DRG: 540 | End: 2021-03-09
Attending: OBSTETRICS & GYNECOLOGY | Admitting: OBSTETRICS & GYNECOLOGY
Payer: MEDICARE

## 2021-03-06 ENCOUNTER — ANESTHESIA EVENT (OUTPATIENT)
Dept: LABOR AND DELIVERY | Age: 31
DRG: 540 | End: 2021-03-06
Payer: MEDICARE

## 2021-03-06 ENCOUNTER — ANESTHESIA (OUTPATIENT)
Dept: LABOR AND DELIVERY | Age: 31
DRG: 540 | End: 2021-03-06
Payer: MEDICARE

## 2021-03-06 VITALS — DIASTOLIC BLOOD PRESSURE: 50 MMHG | OXYGEN SATURATION: 100 % | SYSTOLIC BLOOD PRESSURE: 100 MMHG

## 2021-03-06 DIAGNOSIS — G89.18 POSTOPERATIVE PAIN: Primary | ICD-10-CM

## 2021-03-06 PROBLEM — Z78.9 ADMITTED TO LABOR AND DELIVERY: Status: ACTIVE | Noted: 2021-03-06

## 2021-03-06 LAB
ABO/RH: NORMAL
ALBUMIN SERPL-MCNC: 3.6 G/DL (ref 3.5–4.6)
ALP BLD-CCNC: 396 U/L (ref 40–130)
ALT SERPL-CCNC: 24 U/L (ref 0–33)
AMPHETAMINE SCREEN, URINE: NORMAL
ANION GAP SERPL CALCULATED.3IONS-SCNC: 13 MEQ/L (ref 9–15)
ANTIBODY SCREEN: NORMAL
APTT: 30 SEC (ref 24.4–36.8)
AST SERPL-CCNC: 24 U/L (ref 0–35)
BACTERIA: ABNORMAL /HPF
BARBITURATE SCREEN URINE: NORMAL
BASOPHILS ABSOLUTE: 0.1 K/UL (ref 0–0.2)
BASOPHILS RELATIVE PERCENT: 0.6 %
BENZODIAZEPINE SCREEN, URINE: NORMAL
BILIRUB SERPL-MCNC: <0.2 MG/DL (ref 0.2–0.7)
BILIRUBIN URINE: NEGATIVE
BLOOD, URINE: ABNORMAL
BUN BLDV-MCNC: 16 MG/DL (ref 6–20)
CALCIUM SERPL-MCNC: 9.5 MG/DL (ref 8.5–9.9)
CANNABINOID SCREEN URINE: NORMAL
CHLORIDE BLD-SCNC: 107 MEQ/L (ref 95–107)
CLARITY: ABNORMAL
CO2: 18 MEQ/L (ref 20–31)
COCAINE METABOLITE SCREEN URINE: NORMAL
COLOR: YELLOW
CREAT SERPL-MCNC: 0.6 MG/DL (ref 0.5–0.9)
EOSINOPHILS ABSOLUTE: 0 K/UL (ref 0–0.7)
EOSINOPHILS RELATIVE PERCENT: 0.4 %
EPITHELIAL CELLS, UA: ABNORMAL /HPF (ref 0–5)
GFR AFRICAN AMERICAN: >60
GFR NON-AFRICAN AMERICAN: >60
GLOBULIN: 3.4 G/DL (ref 2.3–3.5)
GLUCOSE BLD-MCNC: 102 MG/DL (ref 70–99)
GLUCOSE BLD-MCNC: 105 MG/DL (ref 60–115)
GLUCOSE BLD-MCNC: 89 MG/DL (ref 60–115)
GLUCOSE URINE: NEGATIVE MG/DL
HCT VFR BLD CALC: 45.2 % (ref 37–47)
HEMOGLOBIN: 15 G/DL (ref 12–16)
HEPATITIS B SURFACE ANTIGEN INTERPRETATION: NORMAL
INR BLD: 1
KETONES, URINE: NEGATIVE MG/DL
LEUKOCYTE ESTERASE, URINE: ABNORMAL
LYMPHOCYTES ABSOLUTE: 1.2 K/UL (ref 1–4.8)
LYMPHOCYTES RELATIVE PERCENT: 10 %
Lab: NORMAL
MCH RBC QN AUTO: 30.7 PG (ref 27–31.3)
MCHC RBC AUTO-ENTMCNC: 33.2 % (ref 33–37)
MCV RBC AUTO: 92.6 FL (ref 82–100)
METHADONE SCREEN, URINE: NORMAL
MONOCYTES ABSOLUTE: 0.8 K/UL (ref 0.2–0.8)
MONOCYTES RELATIVE PERCENT: 6.3 %
NEUTROPHILS ABSOLUTE: 10.3 K/UL (ref 1.4–6.5)
NEUTROPHILS RELATIVE PERCENT: 82.7 %
NITRITE, URINE: NEGATIVE
OPIATE SCREEN URINE: NORMAL
OXYCODONE URINE: NORMAL
PDW BLD-RTO: 15.1 % (ref 11.5–14.5)
PERFORMED ON: NORMAL
PERFORMED ON: NORMAL
PH UA: 5.5 (ref 5–9)
PHENCYCLIDINE SCREEN URINE: NORMAL
PLACENTA ALPHA MICROGLOBULIN-1: POSITIVE
PLATELET # BLD: 152 K/UL (ref 130–400)
POTASSIUM SERPL-SCNC: 4.1 MEQ/L (ref 3.4–4.9)
PROPOXYPHENE SCREEN: NORMAL
PROTEIN UA: 30 MG/DL
PROTHROMBIN TIME: 13.2 SEC (ref 12.3–14.9)
RBC # BLD: 4.88 M/UL (ref 4.2–5.4)
RBC UA: >100 /HPF (ref 0–5)
SARS-COV-2, NAAT: NOT DETECTED
SODIUM BLD-SCNC: 138 MEQ/L (ref 135–144)
SPECIFIC GRAVITY UA: 1.02 (ref 1–1.03)
TOTAL PROTEIN: 7 G/DL (ref 6.3–8)
UROBILINOGEN, URINE: 0.2 E.U./DL
WBC # BLD: 12.4 K/UL (ref 4.8–10.8)
WBC UA: ABNORMAL /HPF (ref 0–5)

## 2021-03-06 PROCEDURE — 81001 URINALYSIS AUTO W/SCOPE: CPT

## 2021-03-06 PROCEDURE — 6360000002 HC RX W HCPCS: Performed by: NURSE ANESTHETIST, CERTIFIED REGISTERED

## 2021-03-06 PROCEDURE — 3609079900 HC CESAREAN SECTION: Performed by: OBSTETRICS & GYNECOLOGY

## 2021-03-06 PROCEDURE — 2580000003 HC RX 258: Performed by: OBSTETRICS & GYNECOLOGY

## 2021-03-06 PROCEDURE — 85730 THROMBOPLASTIN TIME PARTIAL: CPT

## 2021-03-06 PROCEDURE — 2500000003 HC RX 250 WO HCPCS: Performed by: OBSTETRICS & GYNECOLOGY

## 2021-03-06 PROCEDURE — 3700000001 HC ADD 15 MINUTES (ANESTHESIA): Performed by: OBSTETRICS & GYNECOLOGY

## 2021-03-06 PROCEDURE — 86900 BLOOD TYPING SEROLOGIC ABO: CPT

## 2021-03-06 PROCEDURE — 87340 HEPATITIS B SURFACE AG IA: CPT

## 2021-03-06 PROCEDURE — 84112 EVAL AMNIOTIC FLUID PROTEIN: CPT

## 2021-03-06 PROCEDURE — 3700000025 EPIDURAL BLOCK: Performed by: NURSE ANESTHETIST, CERTIFIED REGISTERED

## 2021-03-06 PROCEDURE — 85025 COMPLETE CBC W/AUTO DIFF WBC: CPT

## 2021-03-06 PROCEDURE — 80307 DRUG TEST PRSMV CHEM ANLYZR: CPT

## 2021-03-06 PROCEDURE — 86592 SYPHILIS TEST NON-TREP QUAL: CPT

## 2021-03-06 PROCEDURE — 86901 BLOOD TYPING SEROLOGIC RH(D): CPT

## 2021-03-06 PROCEDURE — 2500000003 HC RX 250 WO HCPCS: Performed by: NURSE ANESTHETIST, CERTIFIED REGISTERED

## 2021-03-06 PROCEDURE — 7100000000 HC PACU RECOVERY - FIRST 15 MIN: Performed by: OBSTETRICS & GYNECOLOGY

## 2021-03-06 PROCEDURE — 87635 SARS-COV-2 COVID-19 AMP PRB: CPT

## 2021-03-06 PROCEDURE — 88307 TISSUE EXAM BY PATHOLOGIST: CPT

## 2021-03-06 PROCEDURE — 6360000002 HC RX W HCPCS: Performed by: OBSTETRICS & GYNECOLOGY

## 2021-03-06 PROCEDURE — 1220000000 HC SEMI PRIVATE OB R&B

## 2021-03-06 PROCEDURE — 7100000001 HC PACU RECOVERY - ADDTL 15 MIN: Performed by: OBSTETRICS & GYNECOLOGY

## 2021-03-06 PROCEDURE — 2709999900 HC NON-CHARGEABLE SUPPLY: Performed by: OBSTETRICS & GYNECOLOGY

## 2021-03-06 PROCEDURE — 59514 CESAREAN DELIVERY ONLY: CPT | Performed by: OBSTETRICS & GYNECOLOGY

## 2021-03-06 PROCEDURE — 85610 PROTHROMBIN TIME: CPT

## 2021-03-06 PROCEDURE — 36415 COLL VENOUS BLD VENIPUNCTURE: CPT

## 2021-03-06 PROCEDURE — 99223 1ST HOSP IP/OBS HIGH 75: CPT | Performed by: OBSTETRICS & GYNECOLOGY

## 2021-03-06 PROCEDURE — 86850 RBC ANTIBODY SCREEN: CPT

## 2021-03-06 PROCEDURE — 3700000000 HC ANESTHESIA ATTENDED CARE: Performed by: OBSTETRICS & GYNECOLOGY

## 2021-03-06 PROCEDURE — 80053 COMPREHEN METABOLIC PANEL: CPT

## 2021-03-06 RX ORDER — LIDOCAINE HYDROCHLORIDE AND EPINEPHRINE 20; 5 MG/ML; UG/ML
INJECTION, SOLUTION EPIDURAL; INFILTRATION; INTRACAUDAL; PERINEURAL PRN
Status: DISCONTINUED | OUTPATIENT
Start: 2021-03-06 | End: 2021-03-07 | Stop reason: SDUPTHER

## 2021-03-06 RX ORDER — KETOROLAC TROMETHAMINE 30 MG/ML
INJECTION, SOLUTION INTRAMUSCULAR; INTRAVENOUS PRN
Status: DISCONTINUED | OUTPATIENT
Start: 2021-03-06 | End: 2021-03-07 | Stop reason: SDUPTHER

## 2021-03-06 RX ORDER — SODIUM CHLORIDE, SODIUM LACTATE, POTASSIUM CHLORIDE, CALCIUM CHLORIDE 600; 310; 30; 20 MG/100ML; MG/100ML; MG/100ML; MG/100ML
INJECTION, SOLUTION INTRAVENOUS CONTINUOUS
Status: DISCONTINUED | OUTPATIENT
Start: 2021-03-06 | End: 2021-03-07

## 2021-03-06 RX ORDER — NALBUPHINE HCL 10 MG/ML
5 AMPUL (ML) INJECTION
Status: ACTIVE | OUTPATIENT
Start: 2021-03-06 | End: 2021-03-06

## 2021-03-06 RX ORDER — ESMOLOL HYDROCHLORIDE 10 MG/ML
INJECTION INTRAVENOUS PRN
Status: DISCONTINUED | OUTPATIENT
Start: 2021-03-06 | End: 2021-03-07 | Stop reason: SDUPTHER

## 2021-03-06 RX ORDER — SODIUM CHLORIDE, SODIUM LACTATE, POTASSIUM CHLORIDE, AND CALCIUM CHLORIDE .6; .31; .03; .02 G/100ML; G/100ML; G/100ML; G/100ML
1000 INJECTION, SOLUTION INTRAVENOUS ONCE
Status: CANCELLED | OUTPATIENT
Start: 2021-03-06 | End: 2021-03-06

## 2021-03-06 RX ORDER — SODIUM CHLORIDE, SODIUM LACTATE, POTASSIUM CHLORIDE, CALCIUM CHLORIDE 600; 310; 30; 20 MG/100ML; MG/100ML; MG/100ML; MG/100ML
INJECTION, SOLUTION INTRAVENOUS CONTINUOUS
Status: CANCELLED | OUTPATIENT
Start: 2021-03-06

## 2021-03-06 RX ORDER — DOCUSATE SODIUM 100 MG/1
100 CAPSULE, LIQUID FILLED ORAL 2 TIMES DAILY
Status: DISCONTINUED | OUTPATIENT
Start: 2021-03-06 | End: 2021-03-07

## 2021-03-06 RX ORDER — SODIUM CHLORIDE 0.9 % (FLUSH) 0.9 %
10 SYRINGE (ML) INJECTION PRN
Status: CANCELLED | OUTPATIENT
Start: 2021-03-06

## 2021-03-06 RX ORDER — SODIUM CHLORIDE 0.9 % (FLUSH) 0.9 %
10 SYRINGE (ML) INJECTION EVERY 12 HOURS SCHEDULED
Status: CANCELLED | OUTPATIENT
Start: 2021-03-06

## 2021-03-06 RX ORDER — FENTANYL CITRATE 50 UG/ML
INJECTION, SOLUTION INTRAMUSCULAR; INTRAVENOUS PRN
Status: DISCONTINUED | OUTPATIENT
Start: 2021-03-06 | End: 2021-03-06

## 2021-03-06 RX ORDER — CEFAZOLIN SODIUM 2 G/50ML
2000 SOLUTION INTRAVENOUS EVERY 8 HOURS
Status: DISCONTINUED | OUTPATIENT
Start: 2021-03-06 | End: 2021-03-07

## 2021-03-06 RX ORDER — ACETAMINOPHEN 325 MG/1
650 TABLET ORAL EVERY 4 HOURS PRN
Status: DISCONTINUED | OUTPATIENT
Start: 2021-03-06 | End: 2021-03-07

## 2021-03-06 RX ORDER — ONDANSETRON 2 MG/ML
4 INJECTION INTRAMUSCULAR; INTRAVENOUS EVERY 6 HOURS PRN
Status: DISCONTINUED | OUTPATIENT
Start: 2021-03-06 | End: 2021-03-07

## 2021-03-06 RX ORDER — ONDANSETRON 2 MG/ML
INJECTION INTRAMUSCULAR; INTRAVENOUS PRN
Status: DISCONTINUED | OUTPATIENT
Start: 2021-03-06 | End: 2021-03-07 | Stop reason: SDUPTHER

## 2021-03-06 RX ORDER — SCOLOPAMINE TRANSDERMAL SYSTEM 1 MG/1
1 PATCH, EXTENDED RELEASE TRANSDERMAL
Status: DISCONTINUED | OUTPATIENT
Start: 2021-03-06 | End: 2021-03-07

## 2021-03-06 RX ORDER — FENTANYL CITRATE 50 UG/ML
INJECTION, SOLUTION INTRAMUSCULAR; INTRAVENOUS PRN
Status: DISCONTINUED | OUTPATIENT
Start: 2021-03-06 | End: 2021-03-07 | Stop reason: SDUPTHER

## 2021-03-06 RX ORDER — MIDAZOLAM HYDROCHLORIDE 2 MG/2ML
INJECTION, SOLUTION INTRAMUSCULAR; INTRAVENOUS PRN
Status: DISCONTINUED | OUTPATIENT
Start: 2021-03-06 | End: 2021-03-07 | Stop reason: SDUPTHER

## 2021-03-06 RX ORDER — CEFAZOLIN SODIUM 2 G/50ML
2000 SOLUTION INTRAVENOUS ONCE
Status: CANCELLED | OUTPATIENT
Start: 2021-03-06 | End: 2021-03-06

## 2021-03-06 RX ORDER — ASPIRIN 81 MG/1
81 TABLET, CHEWABLE ORAL DAILY
Status: ON HOLD | COMMUNITY
End: 2021-03-09 | Stop reason: HOSPADM

## 2021-03-06 RX ORDER — SODIUM CHLORIDE 0.9 % (FLUSH) 0.9 %
10 SYRINGE (ML) INJECTION EVERY 12 HOURS SCHEDULED
Status: DISCONTINUED | OUTPATIENT
Start: 2021-03-06 | End: 2021-03-07

## 2021-03-06 RX ORDER — SODIUM CHLORIDE 0.9 % (FLUSH) 0.9 %
10 SYRINGE (ML) INJECTION PRN
Status: DISCONTINUED | OUTPATIENT
Start: 2021-03-06 | End: 2021-03-07

## 2021-03-06 RX ADMIN — Medication 999 ML/HR: at 23:10

## 2021-03-06 RX ADMIN — Medication 12 ML/HR: at 18:41

## 2021-03-06 RX ADMIN — Medication 1 MILLI-UNITS/MIN: at 14:53

## 2021-03-06 RX ADMIN — SODIUM CHLORIDE, POTASSIUM CHLORIDE, SODIUM LACTATE AND CALCIUM CHLORIDE: 600; 310; 30; 20 INJECTION, SOLUTION INTRAVENOUS at 09:18

## 2021-03-06 RX ADMIN — FAMOTIDINE 20 MG: 10 INJECTION, SOLUTION INTRAVENOUS at 21:22

## 2021-03-06 RX ADMIN — FENTANYL CITRATE 100 MCG: 50 INJECTION, SOLUTION INTRAMUSCULAR; INTRAVENOUS at 22:55

## 2021-03-06 RX ADMIN — KETOROLAC TROMETHAMINE 30 MG: 30 INJECTION, SOLUTION INTRAMUSCULAR; INTRAVENOUS at 23:22

## 2021-03-06 RX ADMIN — Medication 10 ML/HR: at 09:48

## 2021-03-06 RX ADMIN — FAMOTIDINE 20 MG: 10 INJECTION, SOLUTION INTRAVENOUS at 13:19

## 2021-03-06 RX ADMIN — LIDOCAINE HYDROCHLORIDE AND EPINEPHRINE 3 ML: 20; 5 INJECTION, SOLUTION EPIDURAL; INFILTRATION; INTRACAUDAL; PERINEURAL at 22:59

## 2021-03-06 RX ADMIN — ONDANSETRON 4 MG: 2 INJECTION INTRAMUSCULAR; INTRAVENOUS at 23:12

## 2021-03-06 RX ADMIN — ESMOLOL HYDROCHLORIDE 20 MG: 100 INJECTION, SOLUTION INTRAVENOUS at 23:15

## 2021-03-06 RX ADMIN — ONDANSETRON 4 MG: 2 INJECTION INTRAMUSCULAR; INTRAVENOUS at 20:36

## 2021-03-06 RX ADMIN — SODIUM CHLORIDE, POTASSIUM CHLORIDE, SODIUM LACTATE AND CALCIUM CHLORIDE: 600; 310; 30; 20 INJECTION, SOLUTION INTRAVENOUS at 08:10

## 2021-03-06 RX ADMIN — SODIUM CHLORIDE, POTASSIUM CHLORIDE, SODIUM LACTATE AND CALCIUM CHLORIDE: 600; 310; 30; 20 INJECTION, SOLUTION INTRAVENOUS at 21:22

## 2021-03-06 RX ADMIN — CEFAZOLIN SODIUM 2 G: 2 SOLUTION INTRAVENOUS at 22:58

## 2021-03-06 RX ADMIN — LIDOCAINE HYDROCHLORIDE AND EPINEPHRINE 8 ML: 20; 5 INJECTION, SOLUTION EPIDURAL; INFILTRATION; INTRACAUDAL; PERINEURAL at 22:55

## 2021-03-06 RX ADMIN — MIDAZOLAM HYDROCHLORIDE 2 MG: 1 INJECTION, SOLUTION INTRAMUSCULAR; INTRAVENOUS at 23:12

## 2021-03-06 RX ADMIN — Medication 4 MILLI-UNITS/MIN: at 16:33

## 2021-03-06 ASSESSMENT — PULMONARY FUNCTION TESTS
PIF_VALUE: 0

## 2021-03-06 NOTE — H&P
Linsey Tellez is a 32 y.o. female patient. No diagnosis found. Past Medical History:   Diagnosis Date    Gestational diabetes      OB History        15    Para        Term                AB   12    Living           SAB   12    TAB        Ectopic        Molar        Multiple        Live Births                  39w6d  Estimated Date of Delivery: 3/7/21  No Known Allergies  Active Problems:    * No active hospital problems. *  Resolved Problems:    * No resolved hospital problems. *    Blood pressure (!) 126/95, pulse 104, temperature 98.1 °F (36.7 °C), temperature source Oral, resp. rate 18. Maternal Medical History:   Reason for admission: Rupture of membranes. Contractions: Onset was 3-5 hours ago. Frequency: regular. Fetal activity: Perceived fetal activity is normal.      Prenatal Complications - Diabetes: Diabetes is managed by diet. Maternal Exam:   Uterine Assessment: Contraction strength is moderate. Contraction frequency is regular. Abdomen: Patient reports no abdominal tenderness. Pelvis: adequate for delivery. Fetal Exam  Fetal Monitor Review: Variability: moderate (6-25 bpm). Pattern: no decelerations. Fetal State Assessment: Category I - tracings are normal.          Assessment:  Early latent labor and adequate uterine activity. Membrane status: SROM.    Fetal well-being: normal.   33 yo  at 39+wks w/SROM  Pt is GDM - diet controlled    Plan:  Admit to L&D  Epidural for pain management  Continue close monitoring      Edgar Hughes DO  3/6/2021

## 2021-03-06 NOTE — FLOWSHEET NOTE
Call to Dr. Bella Travis to update on pt. Pt came in about 0630 believing she had SROM at 0430 this am. Amnisure came back positive. Orders to admit pt to observe pt for now. House doctor is to follow pt until further notice.

## 2021-03-06 NOTE — ANESTHESIA PROCEDURE NOTES
Epidural Block    Patient location during procedure: OB  Start time: 3/6/2021 9:26 AM  End time: 3/6/2021 9:58 AM  Reason for block: labor epidural  Staffing  Performed: resident/CRNA   Resident/CRNA: ERON Ken CRNA  Preanesthetic Checklist  Completed: patient identified, IV checked, site marked, risks and benefits discussed, surgical consent, monitors and equipment checked, pre-op evaluation, timeout performed, anesthesia consent given, oxygen available and patient being monitored  Epidural  Patient position: sitting  Prep: ChloraPrep  Patient monitoring: continuous pulse ox and frequent blood pressure checks  Approach: midline  Location: lumbar (1-5)  Injection technique: BENITO saline  Provider prep: mask and sterile gloves  Needle  Needle type: Tuohy   Needle gauge: 17 G  Needle length: 3.5 in  Needle insertion depth: 7 cm  Catheter type: side hole  Catheter size: 19 G  Catheter at skin depth: 13 cm  Test dose: negative  Kit: perifix CEAT   Lot number: [id number]  Expiration date: 11/1/2021  Assessment  Sensory level: T10  Hemodynamics: stable  Attempts: 1  Additional Notes  No CSF, no hematoma, no paresthesia.  VSS
daily

## 2021-03-06 NOTE — FLOWSHEET NOTE
Call to SHAMAR Waller CRNA regarding pt stating she is starting to feel her contractions and is rating her pain a 3/10.

## 2021-03-06 NOTE — ANESTHESIA PRE PROCEDURE
Department of Anesthesiology  Preprocedure Note       Name:  Josiah Arellano   Age:  32 y.o.  :  1990                                          MRN:  [id number]         Date:  3/6/2021      Surgeon: * No surgeons listed *    Procedure: * No procedures listed *    Medications prior to admission:   Prior to Admission medications    Medication Sig Start Date End Date Taking? Authorizing Provider   Aimsco Ultra Thin Lancets MISC 1 box by Does not apply route 4 times daily (after meals and at bedtime) 20  Yes Ashley Coma, DO   blood glucose monitor strips by Other route 4 times daily (after meals and at bedtime) Test 4 times a day & as needed for symptoms of irregular blood glucose.  20  Yes Ashley Coma, DO   Alcohol Swabs PADS 1 box by Does not apply route 4 times daily (before meals and nightly) 20  Yes Ashley Coma, DO   glucose monitoring kit (FREESTYLE) monitoring kit 1 kit by Does not apply route daily Test [facility], HS and PRN Please dispense what insurance covers 20  Yes Ashley Coma, DO       Current medications:    Current Facility-Administered Medications   Medication Dose Route Frequency Provider Last Rate Last Admin    lactated ringers infusion   Intravenous Continuous Ashley Coma,  mL/hr at 21 0810 New Bag at 21 0810    sodium chloride flush 0.9 % injection 10 mL  10 mL Intravenous 2 times per day Ashley Coma, DO        sodium chloride flush 0.9 % injection 10 mL  10 mL Intravenous PRN Ashley Coma, DO        ondansetron [facility] injection 4 mg  4 mg Intravenous Q6H PRN Ashley Coma, DO        acetaminophen (TYLENOL) tablet 650 mg  650 mg Oral Q4H PRN Avis Frank DO        benzocaine-menthol (DERMOPLAST) 20-0.5 % spray   Topical PRN Ashley Coma, DO        docusate sodium (COLACE) capsule 100 mg  100 mg Oral BID Avis Frank DO        oxytocin (PITOCIN) 30 units in 500 mL infusion  1-20 clark-units/min Intravenous Continuous Macario Zavala, DO        oxytocin (PITOCIN) 10 unit bolus from the bag  10 Units Intravenous PRN Macario Zavala, DO        And    oxytocin (PITOCIN) 30 units in 500 mL infusion  87.3 clark-units/min Intravenous PRN Macario Zavala, DO        nalbuphine (NUBAIN) injection 5 mg  5 mg Intramuscular Once PRN Macario Zavala, DO        And    nalbuphine (NUBAIN) injection 5 mg  5 mg Intravenous Once PRN Macario Zavala, DO           Allergies:  No Known Allergies    Problem List:  There is no problem list on file for this patient.       Past Medical History:        Diagnosis Date    Gestational diabetes        Past Surgical History:        Procedure Laterality Date    DILATION AND CURETTAGE OF UTERUS      HYSTEROSCOPY         Social History:    Social History     Tobacco Use    Smoking status: Never Smoker   Substance Use Topics    Alcohol use: Not Currently                                Counseling given: Not Answered      Vital Signs (Current):   Vitals:    03/06/21 0642 03/06/21 0643 03/06/21 0813   BP: 120/89 (!) 126/95    Pulse: 90 104    Resp: 18     Temp: 36.7 °C (98.1 °F)  36.4 °C (97.6 °F)   TempSrc: Oral  Oral                                              BP Readings from Last 3 Encounters:   03/06/21 (!) 126/95   02/24/21 122/77   12/15/19 (!) 94/52       NPO Status:                                                                                 BMI:   Wt Readings from Last 3 Encounters:   12/28/20 164 lb 12.8 oz (74.8 kg)   12/15/19 130 lb (59 kg)     There is no height or weight on file to calculate BMI.    CBC:   Lab Results   Component Value Date    WBC 12.4 03/06/2021    RBC 4.88 03/06/2021    RBC 4.45 01/24/2012    HGB 15.0 03/06/2021    HCT 45.2 03/06/2021    MCV 92.6 03/06/2021    RDW 15.1 03/06/2021     03/06/2021       CMP:   Lab Results   Component Value Date     03/06/2021    K 4.1 03/06/2021     03/06/2021    CO2 18 03/06/2021    BUN 16 03/06/2021    CREATININE 0.60 03/06/2021    GFRAA >60.0 03/06/2021    LABGLOM >60.0 03/06/2021    GLUCOSE 102 03/06/2021    PROT 7.0 03/06/2021    CALCIUM 9.5 03/06/2021    BILITOT <0.2 03/06/2021    ALKPHOS 396 03/06/2021    AST 24 03/06/2021    ALT 24 03/06/2021       POC Tests: No results for input(s): POCGLU, POCNA, POCK, POCCL, POCBUN, POCHEMO, POCHCT in the last 72 hours. Coags:   Lab Results   Component Value Date    PROTIME 13.2 03/06/2021    INR 1.0 03/06/2021    APTT 30.0 03/06/2021       HCG (If Applicable):   Lab Results   Component Value Date    HCGQUANT 4980 01/24/2012        ABGs: No results found for: PHART, PO2ART, JOG8HMM, FHL0CDK, BEART, D4LTDMSN     Type & Screen (If Applicable):  No results found for: LABABO, LABRH    Drug/Infectious Status (If Applicable):  No results found for: HIV, HEPCAB    COVID-19 Screening (If Applicable):   Lab Results   Component Value Date    COVID19 Not Detected 03/06/2021    COVID19 Not Detected 03/03/2021         Anesthesia Evaluation  Patient summary reviewed and Nursing notes reviewed  Airway: Mallampati: II  TM distance: >3 FB   Neck ROM: full  Mouth opening: > = 3 FB Dental: normal exam         Pulmonary:Negative Pulmonary ROS             Patient did not smoke on day of surgery. Cardiovascular:  Exercise tolerance: good (>4 METS),         NYHA Classification: II    Rhythm: regular  Rate: normal           Beta Blocker:  Not on Beta Blocker         Neuro/Psych:   Negative Neuro/Psych ROS              GI/Hepatic/Renal: Neg GI/Hepatic/Renal ROS            Endo/Other: Negative Endo/Other ROS                    Abdominal:           Vascular: negative vascular ROS. Anesthesia Plan      epidural     ASA 2             Anesthetic plan and risks discussed with patient. Plan discussed with attending.                   ERON Gómez - TIFFANIE   3/6/2021

## 2021-03-07 LAB — RPR: NORMAL

## 2021-03-07 PROCEDURE — 6370000000 HC RX 637 (ALT 250 FOR IP): Performed by: OBSTETRICS & GYNECOLOGY

## 2021-03-07 PROCEDURE — 1220000000 HC SEMI PRIVATE OB R&B

## 2021-03-07 PROCEDURE — 2580000003 HC RX 258: Performed by: OBSTETRICS & GYNECOLOGY

## 2021-03-07 PROCEDURE — 6360000002 HC RX W HCPCS: Performed by: OBSTETRICS & GYNECOLOGY

## 2021-03-07 RX ORDER — SODIUM CHLORIDE 0.9 % (FLUSH) 0.9 %
10 SYRINGE (ML) INJECTION PRN
Status: DISCONTINUED | OUTPATIENT
Start: 2021-03-07 | End: 2021-03-09 | Stop reason: HOSPADM

## 2021-03-07 RX ORDER — OXYCODONE HYDROCHLORIDE AND ACETAMINOPHEN 5; 325 MG/1; MG/1
2 TABLET ORAL EVERY 4 HOURS PRN
Status: DISCONTINUED | OUTPATIENT
Start: 2021-03-07 | End: 2021-03-09 | Stop reason: HOSPADM

## 2021-03-07 RX ORDER — SIMETHICONE 80 MG
80 TABLET,CHEWABLE ORAL EVERY 6 HOURS PRN
Status: DISCONTINUED | OUTPATIENT
Start: 2021-03-07 | End: 2021-03-09 | Stop reason: HOSPADM

## 2021-03-07 RX ORDER — DIPHENHYDRAMINE HYDROCHLORIDE 50 MG/ML
25 INJECTION INTRAMUSCULAR; INTRAVENOUS EVERY 6 HOURS PRN
Status: DISCONTINUED | OUTPATIENT
Start: 2021-03-07 | End: 2021-03-09 | Stop reason: HOSPADM

## 2021-03-07 RX ORDER — IBUPROFEN 600 MG/1
600 TABLET ORAL EVERY 6 HOURS PRN
Status: DISCONTINUED | OUTPATIENT
Start: 2021-03-07 | End: 2021-03-09 | Stop reason: HOSPADM

## 2021-03-07 RX ORDER — KETOROLAC TROMETHAMINE 30 MG/ML
30 INJECTION, SOLUTION INTRAMUSCULAR; INTRAVENOUS EVERY 6 HOURS PRN
Status: DISPENSED | OUTPATIENT
Start: 2021-03-07 | End: 2021-03-08

## 2021-03-07 RX ORDER — SODIUM CHLORIDE, SODIUM LACTATE, POTASSIUM CHLORIDE, CALCIUM CHLORIDE 600; 310; 30; 20 MG/100ML; MG/100ML; MG/100ML; MG/100ML
INJECTION, SOLUTION INTRAVENOUS CONTINUOUS
Status: DISCONTINUED | OUTPATIENT
Start: 2021-03-07 | End: 2021-03-09 | Stop reason: HOSPADM

## 2021-03-07 RX ORDER — PRENATAL VIT/IRON FUM/FOLIC AC 27MG-0.8MG
1 TABLET ORAL DAILY
Status: DISCONTINUED | OUTPATIENT
Start: 2021-03-07 | End: 2021-03-09 | Stop reason: HOSPADM

## 2021-03-07 RX ORDER — MODIFIED LANOLIN
OINTMENT (GRAM) TOPICAL
Status: DISCONTINUED | OUTPATIENT
Start: 2021-03-07 | End: 2021-03-09 | Stop reason: HOSPADM

## 2021-03-07 RX ORDER — OXYCODONE HYDROCHLORIDE AND ACETAMINOPHEN 5; 325 MG/1; MG/1
1 TABLET ORAL EVERY 4 HOURS PRN
Status: DISCONTINUED | OUTPATIENT
Start: 2021-03-07 | End: 2021-03-09 | Stop reason: HOSPADM

## 2021-03-07 RX ORDER — SODIUM CHLORIDE 0.9 % (FLUSH) 0.9 %
10 SYRINGE (ML) INJECTION EVERY 12 HOURS SCHEDULED
Status: DISCONTINUED | OUTPATIENT
Start: 2021-03-07 | End: 2021-03-09 | Stop reason: HOSPADM

## 2021-03-07 RX ORDER — ONDANSETRON 2 MG/ML
4 INJECTION INTRAMUSCULAR; INTRAVENOUS EVERY 6 HOURS PRN
Status: DISCONTINUED | OUTPATIENT
Start: 2021-03-07 | End: 2021-03-09 | Stop reason: HOSPADM

## 2021-03-07 RX ORDER — DOCUSATE SODIUM 100 MG/1
100 CAPSULE, LIQUID FILLED ORAL DAILY
Status: DISCONTINUED | OUTPATIENT
Start: 2021-03-07 | End: 2021-03-09 | Stop reason: HOSPADM

## 2021-03-07 RX ADMIN — OXYCODONE HYDROCHLORIDE AND ACETAMINOPHEN 1 TABLET: 5; 325 TABLET ORAL at 20:32

## 2021-03-07 RX ADMIN — SIMETHICONE 80 MG: 80 TABLET, CHEWABLE ORAL at 16:21

## 2021-03-07 RX ADMIN — OXYCODONE HYDROCHLORIDE AND ACETAMINOPHEN 2 TABLET: 5; 325 TABLET ORAL at 09:54

## 2021-03-07 RX ADMIN — KETOROLAC TROMETHAMINE 30 MG: 30 INJECTION, SOLUTION INTRAMUSCULAR; INTRAVENOUS at 21:32

## 2021-03-07 RX ADMIN — OXYCODONE HYDROCHLORIDE AND ACETAMINOPHEN 2 TABLET: 5; 325 TABLET ORAL at 04:46

## 2021-03-07 RX ADMIN — PRENATAL VIT W/ FE FUMARATE-FA TAB 27-0.8 MG 1 TABLET: 27-0.8 TAB at 09:13

## 2021-03-07 RX ADMIN — KETOROLAC TROMETHAMINE 30 MG: 30 INJECTION, SOLUTION INTRAMUSCULAR; INTRAVENOUS at 09:13

## 2021-03-07 RX ADMIN — OXYCODONE HYDROCHLORIDE AND ACETAMINOPHEN 2 TABLET: 5; 325 TABLET ORAL at 14:59

## 2021-03-07 RX ADMIN — Medication: at 09:53

## 2021-03-07 RX ADMIN — SODIUM CHLORIDE, POTASSIUM CHLORIDE, SODIUM LACTATE AND CALCIUM CHLORIDE: 600; 310; 30; 20 INJECTION, SOLUTION INTRAVENOUS at 02:56

## 2021-03-07 RX ADMIN — KETOROLAC TROMETHAMINE 30 MG: 30 INJECTION, SOLUTION INTRAMUSCULAR; INTRAVENOUS at 15:21

## 2021-03-07 RX ADMIN — SODIUM CHLORIDE, POTASSIUM CHLORIDE, SODIUM LACTATE AND CALCIUM CHLORIDE: 600; 310; 30; 20 INJECTION, SOLUTION INTRAVENOUS at 10:31

## 2021-03-07 RX ADMIN — DOCUSATE SODIUM 100 MG: 100 CAPSULE, LIQUID FILLED ORAL at 09:13

## 2021-03-07 ASSESSMENT — PAIN SCALES - GENERAL
PAINLEVEL_OUTOF10: 7
PAINLEVEL_OUTOF10: 7
PAINLEVEL_OUTOF10: 5
PAINLEVEL_OUTOF10: 4
PAINLEVEL_OUTOF10: 7

## 2021-03-07 NOTE — PROGRESS NOTES
Patient and FOB decided they would like to progress with a . Dr Cherie Sadler is going to come in to perform the procedure. Dr Cooley Kinds discussing with patient fibroid and where it is located. Fibroid is on lower uterine segment.

## 2021-03-07 NOTE — FLOWSHEET NOTE
Up to void 650cc urine. Moves guarded but does well. Giovanna care done.  Brushes teeth and assisted to chair

## 2021-03-07 NOTE — PLAN OF CARE
Problem: Pain:  Description: Pain management should include both nonpharmacologic and pharmacologic interventions.   Goal: Pain level will decrease  Description: Pain level will decrease  Outcome: Completed  Goal: Control of acute pain  Description: Control of acute pain  Outcome: Completed  Goal: Control of chronic pain  Description: Control of chronic pain  Outcome: Completed     Problem: Anxiety:  Goal: Level of anxiety will decrease  Description: Level of anxiety will decrease  Outcome: Completed     Problem: Breathing Pattern - Ineffective:  Goal: Able to breathe comfortably  Description: Able to breathe comfortably  Outcome: Completed     Problem: Fluid Volume - Imbalance:  Goal: Absence of imbalanced fluid volume signs and symptoms  Description: Absence of imbalanced fluid volume signs and symptoms  Outcome: Completed  Goal: Absence of intrapartum hemorrhage signs and symptoms  Description: Absence of intrapartum hemorrhage signs and symptoms  Outcome: Completed     Problem: Infection - Intrapartum Infection:  Goal: Will show no infection signs and symptoms  Description: Will show no infection signs and symptoms  Outcome: Completed     Problem: Labor Process - Prolonged:  Goal: Labor progression, first stage, within specified pattern  Description: Labor progression, first stage, within specified pattern  Outcome: Completed  Goal: Labor progession, second stage, within specified pattern  Description: Labor progession, second stage, within specified pattern  Outcome: Completed  Goal: Uterine contractions within specified parameters  Description: Uterine contractions within specified parameters  Outcome: Completed     Problem:  Screening:  Goal: Ability to make informed decisions regarding treatment has improved  Description: Ability to make informed decisions regarding treatment has improved  Outcome: Completed     Problem: Pain - Acute:  Goal: Pain level will decrease  Description: Pain level will decrease  Outcome: Completed  Goal: Able to cope with pain  Description: Able to cope with pain  Outcome: Completed     Problem: Tissue Perfusion - Uteroplacental, Altered:  Description: [TRUNCATED] For intrapartum patients with recurrent variable decelerations of the fetal heart rate, consider transcervical amnioinfusion. For patients in labor, avoid prophylactic use of continuous maternal oxygen supplementation to prevent nonreassu . ..   Goal: Absence of abnormal fetal heart rate pattern  Description: Absence of abnormal fetal heart rate pattern  Outcome: Completed     Problem: Urinary Retention:  Goal: Experiences of bladder distention will decrease  Description: Experiences of bladder distention will decrease  Outcome: Completed  Goal: Urinary elimination within specified parameters  Description: Urinary elimination within specified parameters  Outcome: Completed

## 2021-03-07 NOTE — OP NOTE
[address]                      [address], [address] [facility]                                OPERATIVE REPORT    PATIENT NAME: Bren Ware                     :        1990  MED REC NO:   [id number]                            ROOM:       0320  ACCOUNT NO:   [de-identified]                           ADMIT DATE: 2021  PROVIDER:     Kitty Sahni DO    DATE OF PROCEDURE:  2021    PREOPERATIVE DIAGNOSES:  Intrauterine pregnancy at 39 weeks,  nonreassuring fetal heart rate, meconium-stained amniotic fluid,  persistent occiput posterior position and a diet-controlled gestational  diabetic. POSTOPERATIVE DIAGNOSES:  Intrauterine pregnancy at 39 weeks,  nonreassuring fetal heart rate, meconium-stained amniotic fluid,  persistent occiput posterior position and a diet-controlled gestational  diabetic with the delivery of a viable female. PROCEDURE:  Primary low segment transverse  section. SURGEON:  Jose Frank DO    ASSISTANT:  _____. ANESTHESIA:  Epidural.    COMPLICATIONS:  None. BLOOD LOSS:  750 mL. INDICATIONS:  This is a 70-year-old  8 para now 1-0-7-1 at 39  weeks. She came in this morning with ruptured membranes. She was  started on Pitocin. She got to about 8-9 cm, began having some late  fetal heart rate decelerations, there then was thick meconium stained  fluid. They were able to resuscitate the baby. She did get to  complete. There was no descent with pushing. The baby was occiput  posterior and the fetal heart rate was nonreassuring. The decision was  made to proceed with primary  section. The potential  complications of the procedure; bleeding; infection; medication  reaction; bowel, bladder, ureter injury or vascular complications were  explained to her in detail. Appropriate consent was obtained and was  placed on the chart.     DESCRIPTION OF PROCEDURE:  The patient was taken to the operating room  where an epidural was re-dosed appropriately for  section. She  was then placed supine on the operating room table with appropriate left  uterine displacement. Prepped and draped in usual fashion. Bladder was  drained with a Hernandez catheter. Time-out was called. Anesthetic levels  were checked at the site of the proposed incision and deemed to be  appropriate to proceed. A transverse suprapubic incision was then made  and then the abdomen was opened through a Pfannenstiel incision. The  peritoneum was entered bluntly and retracted laterally. The  vesicouterine reflection was identified, elevated and incised and then  the bladder was  from the lower uterine segment. The  myometrium was scored. The intrauterine cavity was entered bluntly. The myometrial incision was extended bilaterally. The head was then  lifted from low segment incision. The oronasopharynx was suctioned free  of clear secretions. The baby was in the direct occiput posterior  position. There was no nuchal cord. The remainder of the baby was  delivered and was placed on the operating room table. It was a grossly  normal female. She was born at 23:09 on . The cord was clamped  and cut after a short delay and the baby was taken to the warmer and  evaluated by nursing. Apgars 9 and 9. Birthweight 3494 gm or 7 pounds  11 ounces. Cord bloods were obtained. The placenta was then manually  removed from the intrauterine cavity which was then wiped free of any  retained placental tissue. She received Pitocin and adequate  contraction of the uterus occurred. The uterus was then exteriorized  for repair. The myometrium was closed using a double layer closure,  first layer running locking, second layer imbricating. Two  figure-of-eight stitches were necessary to achieve hemostasis.   There  was a 5-6 cm fibroid on the anterior wall at the level of the mid  portion of the uterus well above where the myometrial incision was made. The fallopian tubes and ovaries bilaterally were normal.  The uterus was  then returned to its pelvic location. The pelvis was copiously  irrigated, cleansed of any debris and blood clot. There was hemostasis  present on the myometrial incision. Surgicel was placed over the area. The peritoneum was then closed. The fascia was closed with #1 Vicryl  sutures. Subcutaneous fat was closed and then the skin edges were  reapproximated with Insorb staples. The incision was cleansed and  appropriately dressed. The uterus was Crede'd free of clots and  irrigating solution. The patient was then taken to the recovery room in  good condition. She tolerated the surgery and the anesthesia well. Both baby and mom are doing well. Blood loss was 750 mL. Sponge,  needle, and instrument counts were correct.         Malathi Ramirez DO    D: 03/06/2021 23:56:51       T: 03/07/2021 0:00:50     [email or web address]  Job#: [id number]     Doc#: [id number]    CC:

## 2021-03-07 NOTE — PROGRESS NOTES
Department of Obstetrics and Gynecology  Labor and Delivery   Attending Progress Note      SUBJECTIVE:  Pt is comfortable, no complaints    OBJECTIVE:      Vitals:    /61   Pulse 100   Temp 98.6 °F (37 °C) (Oral)   Resp 18   Ht 5' 1\" (1.549 m)   Wt 176 lb (79.8 kg)   SpO2 (!) 87%   BMI 33.25 kg/m²     Uterine Size:  39 cm, size consistent with dates    Fetal heart rate:       Baseline Heart Rate:  140        Accelerations:  present       Long Term Variability:  moderate       Decelerations:  late         Contraction frequency: irregular minutes    Fetal Presentation:  Cephalic    Fetal Position:  Cephalic    Membranes:  Ruptured clear fluid    Cervix:           Dilation:  8 cm         Effacement:  90         Station:  0         Consistency:  soft         Position:  mid            DATA:  LAB REVIEW:    CBC:    Lab Results   Component Value Date    WBC 12.4 2021    RBC 4.88 2021    RBC 4.45 2012    HGB 15.0 2021    HCT 45.2 2021    MCV 92.6 2021    RDW 15.1 2021     2021       ASSESSMENT & PLAN:    31 yo  at 39+wks w/GDM in labor  There was a period of category II tracing with minimal variability and two late decelerations - now resolved  Oxytocin halved, uterine resusciitation performed  Pt has continued to progress in labor  Will continue close monitoring of FHR and blood glucose

## 2021-03-07 NOTE — PROGRESS NOTES
RN and Dr Olesya Davies at bedside. Accu check done, result 105. Pitocin lowered to 4 mu due to contraction pattern at this time. SVE performed by Dr Olesya Davies. Patient placed in left tilt with peanut ball placed under her right leg. Patient denies pain or discomfort at this time.

## 2021-03-07 NOTE — ANESTHESIA POSTPROCEDURE EVALUATION
Department of Anesthesiology  Postprocedure Note    Patient: Nori Aguilar  MRN: [id number]  YOB: 1990  Date of evaluation: 3/7/2021  Time:  12:01 AM     Procedure Summary     Date: 21 Room / Location: [facility]    Anesthesia Start:  Anesthesia Stop: 21 0001    Procedures:        SECTION (N/A )      Labor Analgesia Diagnosis: (Fetal Intolerance of Labor)    Surgeons: Cruz Abernathy DO Responsible Provider: ERON Batista CRNA    Anesthesia Type: epidural ASA Status: 2          Anesthesia Type: epidural    Pam Phase I:      Pam Phase II:      Last vitals: Reviewed and per EMR flowsheets.        Anesthesia Post Evaluation    Patient location during evaluation: PACU  Patient participation: complete - patient participated  Level of consciousness: awake and alert  Pain score: 1  Airway patency: patent  Nausea & Vomiting: no nausea and no vomiting  Complications: no  Cardiovascular status: hemodynamically stable  Respiratory status: acceptable and room air  Hydration status: euvolemic  Comments: Report to RN, normal sinus rhythm

## 2021-03-07 NOTE — PROGRESS NOTES
Pt seen at bedside for FHR tracing showing minimal variability again.      VSS  VE: 9cm/0 station    A/P: 31 yo primip at term in labor  Stop oxytocin  IVF Bolus  O2  Cont close monitoring = FHR now category I

## 2021-03-07 NOTE — PROGRESS NOTES
Pt seen and examined at bedside. Pt not pushing well, unable to reduce anterior lip. FHR tracing category I at this time, however, thick meconium noted. We discussed possible  section for off and on category II tracing. Pt and partner have opted for  section. R/B/A of procedure discussed including but not limited to infection, bleeding and damage to surrounding organs such as bowel, bladder, ureters, as well as fetus. Pt verbalized understanding and agrees to go forward with procedure.

## 2021-03-07 NOTE — PROGRESS NOTES
In to room to reposition patient, patient complaining of pain, epidural pump beeping. CRNA called to room for pump. SVE performed, patient now 9 cm. Repositioned into left tilt. Patient complaining of still feeling nauseated, asking if she can make herself vomit. Encouraged patient that if she vomited it was fine, patient has basin, but encouraged patient not to induce vomiting with finger. 2055 - Dr Fermin Mt in room. Would like pitocin discontinued for now. O2 provided per Dr jacobsen.

## 2021-03-07 NOTE — FLOWSHEET NOTE
Patient was moved to this room via [address] [place] [address] steady. Wanted to get back in bed to take a nap. Infant to go to nursery.

## 2021-03-07 NOTE — PROGRESS NOTES
Dr Crys Meehan in room.  Dr Crys Meehan performed SVE, patient with an anterior lip. Thick mec noted.  - Dr Crys Meehan discussing possible need for .  - Decision to try to reduce anterior lip and push.

## 2021-03-07 NOTE — PLAN OF CARE
Problem: Fluid Volume - Imbalance:  Goal: Absence of postpartum hemorrhage signs and symptoms  Description: Absence of postpartum hemorrhage signs and symptoms  Outcome: Ongoing  Goal: Absence of imbalanced fluid volume signs and symptoms  Description: Absence of imbalanced fluid volume signs and symptoms  Outcome: Ongoing     Problem: Pain - Acute:  Goal: Pain level will decrease  Description: Pain level will decrease  Outcome: Ongoing     Problem: Urinary Retention:  Goal: Urinary elimination within specified parameters  Description: Urinary elimination within specified parameters  Outcome: Ongoing     Problem: Discharge Planning:  Goal: Discharged to appropriate level of care  Description: Discharged to appropriate level of care  Outcome: Ongoing     Problem: Infection - Surgical Site:  Goal: Will show no infection signs and symptoms  Description: Will show no infection signs and symptoms  Outcome: Ongoing     Problem: Venous Thromboembolism:  Goal: Will show no signs or symptoms of venous thromboembolism  Description: Will show no signs or symptoms of venous thromboembolism  Outcome: Ongoing  Goal: Absence of signs or symptoms of impaired coagulation  Description: Absence of signs or symptoms of impaired coagulation  Outcome: Ongoing

## 2021-03-07 NOTE — PROGRESS NOTES
Dr Fred Vega at bedside. SVE performed, baby OP. Patient completely dilated. Will try to push once to see if patient pushes well.

## 2021-03-08 LAB
BASOPHILS ABSOLUTE: 0 K/UL (ref 0–0.2)
BASOPHILS RELATIVE PERCENT: 0.1 %
EOSINOPHILS ABSOLUTE: 0.2 K/UL (ref 0–0.7)
EOSINOPHILS RELATIVE PERCENT: 1.7 %
HCT VFR BLD CALC: 31 % (ref 37–47)
HEMOGLOBIN: 10.2 G/DL (ref 12–16)
LYMPHOCYTES ABSOLUTE: 1.7 K/UL (ref 1–4.8)
LYMPHOCYTES RELATIVE PERCENT: 12.6 %
MCH RBC QN AUTO: 31 PG (ref 27–31.3)
MCHC RBC AUTO-ENTMCNC: 33 % (ref 33–37)
MCV RBC AUTO: 93.9 FL (ref 82–100)
MONOCYTES ABSOLUTE: 1.2 K/UL (ref 0.2–0.8)
MONOCYTES RELATIVE PERCENT: 9 %
NEUTROPHILS ABSOLUTE: 10.2 K/UL (ref 1.4–6.5)
NEUTROPHILS RELATIVE PERCENT: 76.6 %
PDW BLD-RTO: 15.4 % (ref 11.5–14.5)
PLATELET # BLD: 147 K/UL (ref 130–400)
RBC # BLD: 3.31 M/UL (ref 4.2–5.4)
WBC # BLD: 13.4 K/UL (ref 4.8–10.8)

## 2021-03-08 PROCEDURE — 6370000000 HC RX 637 (ALT 250 FOR IP): Performed by: OBSTETRICS & GYNECOLOGY

## 2021-03-08 PROCEDURE — 85025 COMPLETE CBC W/AUTO DIFF WBC: CPT

## 2021-03-08 PROCEDURE — 1220000000 HC SEMI PRIVATE OB R&B

## 2021-03-08 PROCEDURE — 6360000002 HC RX W HCPCS: Performed by: OBSTETRICS & GYNECOLOGY

## 2021-03-08 PROCEDURE — 99232 SBSQ HOSP IP/OBS MODERATE 35: CPT | Performed by: OBSTETRICS & GYNECOLOGY

## 2021-03-08 PROCEDURE — 36415 COLL VENOUS BLD VENIPUNCTURE: CPT

## 2021-03-08 PROCEDURE — S9443 LACTATION CLASS: HCPCS

## 2021-03-08 RX ADMIN — OXYCODONE HYDROCHLORIDE AND ACETAMINOPHEN 1 TABLET: 5; 325 TABLET ORAL at 07:27

## 2021-03-08 RX ADMIN — OXYCODONE HYDROCHLORIDE AND ACETAMINOPHEN 1 TABLET: 5; 325 TABLET ORAL at 01:59

## 2021-03-08 RX ADMIN — IBUPROFEN 600 MG: 600 TABLET, FILM COATED ORAL at 15:25

## 2021-03-08 RX ADMIN — OXYCODONE HYDROCHLORIDE AND ACETAMINOPHEN 1 TABLET: 5; 325 TABLET ORAL at 12:11

## 2021-03-08 RX ADMIN — PRENATAL VIT W/ FE FUMARATE-FA TAB 27-0.8 MG 1 TABLET: 27-0.8 TAB at 09:31

## 2021-03-08 RX ADMIN — IBUPROFEN 600 MG: 600 TABLET, FILM COATED ORAL at 09:31

## 2021-03-08 RX ADMIN — OXYCODONE HYDROCHLORIDE AND ACETAMINOPHEN 1 TABLET: 5; 325 TABLET ORAL at 17:16

## 2021-03-08 RX ADMIN — DOCUSATE SODIUM 100 MG: 100 CAPSULE, LIQUID FILLED ORAL at 09:31

## 2021-03-08 RX ADMIN — KETOROLAC TROMETHAMINE 30 MG: 30 INJECTION, SOLUTION INTRAMUSCULAR; INTRAVENOUS at 03:37

## 2021-03-08 ASSESSMENT — PAIN SCALES - GENERAL
PAINLEVEL_OUTOF10: 5
PAINLEVEL_OUTOF10: 4
PAINLEVEL_OUTOF10: 6
PAINLEVEL_OUTOF10: 5

## 2021-03-08 NOTE — LACTATION NOTE
In to visit pt  Mother states infant is not always latching with or with out nipple shield  Reviewed breastfeeding concepts with pt  Encouraged mother to breast feed every 2-3 hours for 10-20 minutes  Mother has a breast pump at home  Informed mother about our breast feeding warm line and [email or web address]  Encouraged mother to call with the next breast feed      0906  Infant to breast with football hold Infant arching and pulling away from breast  Not latching  Extra small nipple shield applied  Drops of donor milk give to infant and on nipple shield  Infant position changed to cross cradle  Infant latched and sucking  Infant needed stimulation to remain awake and sucking  Infant breast fed for 20 minutes  Infant to right breast in football hold with nipple shield    Mother has medium size breast and small nipples that are indented in the centers    Dr Yoko Yarbrough informed of infant arching and having difficulty latching requested occupational therapy     80  Mother states infant breast fed for 20 minutes on right breast with nipple shield  Mother concern that infant needs the  nipple shield to latch and nurse  Reassured mother and reinforced that the infant is getting breast milk   Mother concern about how she is going to feed infant once discharged  Encouraged mother to breast feed infant every 2-3 hours if infant does not latch to pump breast and give expressed breast milk to infant  Infant to left breast without nipple shield   Infant did not latch extra small  Nipple shield applied infant rooting not latching  Drops of donor milk given to infant via a syringe and feeding tube  Infant breast fed for 15 minutes    18  Mother pumping her breast  Instructed how to operate and clean the breast pump  Instructed to save any expressed breast milk 2 cc colostrum expressed    1500  Infant sleepy to left breast with nipple shield  Infant took 2 cc colostrum  Infant sucking ocasionallly  Encouraged mother to undress infant to awaken and place on right breast  Infant fed 9 minutes on the left and 2 minutes on the right

## 2021-03-08 NOTE — FLOWSHEET NOTE
21:40 patient given breastpump and educated to use it after feedings for 20 mins.  Patient states understanding

## 2021-03-08 NOTE — PLAN OF CARE
Problem: Fluid Volume - Imbalance:  Goal: Absence of postpartum hemorrhage signs and symptoms  Description: Absence of postpartum hemorrhage signs and symptoms  3/8/2021 0802 by Jason Viera RN  Outcome: Ongoing  3/7/2021 2036 by Mayela Morris RN  Outcome: Ongoing  Goal: Absence of imbalanced fluid volume signs and symptoms  Description: Absence of imbalanced fluid volume signs and symptoms  3/8/2021 0802 by Jason Viera RN  Outcome: Ongoing  3/7/2021 2036 by Mayela Morris RN  Outcome: Ongoing     Problem: Pain - Acute:  Goal: Pain level will decrease  Description: Pain level will decrease  3/8/2021 0802 by Jason Viera RN  Outcome: Ongoing  3/7/2021 2036 by Mayela Morris RN  Outcome: Ongoing     Problem: Discharge Planning:  Goal: Discharged to appropriate level of care  Description: Discharged to appropriate level of care  3/8/2021 0802 by Jason Viera RN  Outcome: Ongoing  3/7/2021 2036 by Mayela Morris RN  Outcome: Ongoing     Problem: Infection - Surgical Site:  Goal: Will show no infection signs and symptoms  Description: Will show no infection signs and symptoms  3/8/2021 0802 by Jason Viera RN  Outcome: Ongoing  3/7/2021 2036 by Mayela Morris RN  Outcome: Ongoing     Problem: Venous Thromboembolism:  Goal: Will show no signs or symptoms of venous thromboembolism  Description: Will show no signs or symptoms of venous thromboembolism  3/8/2021 0802 by Jason Viera RN  Outcome: Ongoing  3/7/2021 2036 by Mayela Morris RN  Outcome: Ongoing  Goal: Absence of signs or symptoms of impaired coagulation  Description: Absence of signs or symptoms of impaired coagulation  3/8/2021 0802 by Jason Viera RN  Outcome: Ongoing  3/7/2021 2036 by Mayela Morris RN  Outcome: Ongoing

## 2021-03-08 NOTE — PLAN OF CARE
Problem: Fluid Volume - Imbalance:  Goal: Absence of postpartum hemorrhage signs and symptoms  Description: Absence of postpartum hemorrhage signs and symptoms  3/7/2021 2036 by Shanae Mendieta RN  Outcome: Ongoing  3/7/2021 0908 by Faith Ivy RN  Outcome: Ongoing  Goal: Absence of imbalanced fluid volume signs and symptoms  Description: Absence of imbalanced fluid volume signs and symptoms  3/7/2021 2036 by Shanae Mendieta RN  Outcome: Ongoing  3/7/2021 0908 by Faith Ivy RN  Outcome: Ongoing     Problem: Pain - Acute:  Goal: Pain level will decrease  Description: Pain level will decrease  3/7/2021 2036 by Shanae Mendieta RN  Outcome: Ongoing  3/7/2021 0908 by Faith Ivy RN  Outcome: Ongoing     Problem: Urinary Retention:  Goal: Urinary elimination within specified parameters  Description: Urinary elimination within specified parameters  3/7/2021 2036 by Shanae Mendieta RN  Outcome: Ongoing  3/7/2021 0908 by Faith Ivy RN  Outcome: Ongoing     Problem: Discharge Planning:  Goal: Discharged to appropriate level of care  Description: Discharged to appropriate level of care  3/7/2021 2036 by Shanae Mendieta RN  Outcome: Ongoing  3/7/2021 0908 by Faith Ivy RN  Outcome: Ongoing     Problem: Infection - Surgical Site:  Goal: Will show no infection signs and symptoms  Description: Will show no infection signs and symptoms  3/7/2021 2036 by Shanae Mendieta RN  Outcome: Ongoing  3/7/2021 0908 by Faith Ivy RN  Outcome: Ongoing     Problem: Venous Thromboembolism:  Goal: Will show no signs or symptoms of venous thromboembolism  Description: Will show no signs or symptoms of venous thromboembolism  3/7/2021 2036 by Shanae Mendieta RN  Outcome: Ongoing  3/7/2021 0908 by Faith Ivy RN  Outcome: Ongoing  Goal: Absence of signs or symptoms of impaired coagulation  Description: Absence of signs or symptoms of impaired coagulation  3/7/2021 2036 by Shanae Mendieta RN  Outcome: Ongoing  3/7/2021 0908 by Servando Swain Xavier Lima RN  Outcome: Ongoing

## 2021-03-08 NOTE — PROGRESS NOTES
C Section Postpartum Progress Note    Subjective:      32 y.o. [id number] @ 39w6d    Postpartum Day 2:  [facility]    The patient feels well. The patient denies emotional concerns. Pain is well controlled with current medications. The baby iswell. Baby is feeding via breast. Urinary output is adequate. The patient is ambulating well. The patient is tolerating a normal diet. Flatus denies been passed. Objective:      Patient Vitals for the past 8 hrs:   BP Temp Temp src Pulse Resp SpO2   21 0731 115/66 97.2 °F (36.2 °C) Oral 83 18 99 %     General:    alert, appears stated age and cooperative   Bowel Sounds:  active   Lochia:  appropriate   Uterine Fundus:   firm   Incision:  healing well, no significant drainage, no dehiscence, no significant erythema   DVT Evaluation:  No evidence of DVT seen on physical exam.         Assessment:     Status post  section. Doing well postoperatively. Plan: 1. Routine post op care    Continue current care.     Aniceto Dawson

## 2021-03-09 VITALS
DIASTOLIC BLOOD PRESSURE: 71 MMHG | HEIGHT: 61 IN | TEMPERATURE: 98.2 F | SYSTOLIC BLOOD PRESSURE: 134 MMHG | RESPIRATION RATE: 18 BRPM | WEIGHT: 176 LBS | BODY MASS INDEX: 33.23 KG/M2 | OXYGEN SATURATION: 100 % | HEART RATE: 93 BPM

## 2021-03-09 PROCEDURE — 6370000000 HC RX 637 (ALT 250 FOR IP): Performed by: OBSTETRICS & GYNECOLOGY

## 2021-03-09 PROCEDURE — 99238 HOSP IP/OBS DSCHRG MGMT 30/<: CPT | Performed by: OBSTETRICS & GYNECOLOGY

## 2021-03-09 RX ORDER — OXYCODONE HYDROCHLORIDE AND ACETAMINOPHEN 5; 325 MG/1; MG/1
1 TABLET ORAL EVERY 6 HOURS PRN
Qty: 20 TABLET | Refills: 0 | Status: SHIPPED | OUTPATIENT
Start: 2021-03-09 | End: 2021-03-14

## 2021-03-09 RX ORDER — IBUPROFEN 600 MG/1
600 TABLET ORAL 4 TIMES DAILY PRN
Qty: 40 TABLET | Refills: 0 | Status: SHIPPED | OUTPATIENT
Start: 2021-03-09

## 2021-03-09 RX ADMIN — OXYCODONE HYDROCHLORIDE AND ACETAMINOPHEN 2 TABLET: 5; 325 TABLET ORAL at 18:28

## 2021-03-09 RX ADMIN — OXYCODONE HYDROCHLORIDE AND ACETAMINOPHEN 2 TABLET: 5; 325 TABLET ORAL at 00:34

## 2021-03-09 RX ADMIN — OXYCODONE HYDROCHLORIDE AND ACETAMINOPHEN 1 TABLET: 5; 325 TABLET ORAL at 11:39

## 2021-03-09 RX ADMIN — DOCUSATE SODIUM 100 MG: 100 CAPSULE, LIQUID FILLED ORAL at 09:07

## 2021-03-09 RX ADMIN — IBUPROFEN 600 MG: 600 TABLET, FILM COATED ORAL at 09:07

## 2021-03-09 RX ADMIN — OXYCODONE HYDROCHLORIDE AND ACETAMINOPHEN 2 TABLET: 5; 325 TABLET ORAL at 05:43

## 2021-03-09 RX ADMIN — PRENATAL VIT W/ FE FUMARATE-FA TAB 27-0.8 MG 1 TABLET: 27-0.8 TAB at 09:07

## 2021-03-09 ASSESSMENT — PAIN SCALES - GENERAL
PAINLEVEL_OUTOF10: 7
PAINLEVEL_OUTOF10: 7
PAINLEVEL_OUTOF10: 5

## 2021-03-09 NOTE — LACTATION NOTE
This note was copied from a baby's chart. Discussed with Dr. Lord Crane difficult feeding and not staying latched with or without the shield. Will assess the infant.

## 2021-03-09 NOTE — LACTATION NOTE
This note was copied from a baby's chart. Mother pumping at bedside. States infant is struggling with latch. Infant gets on the breast and pops off. Has been using nipple shield because she cannot obtain any latch without. Infant fussy. Placed to left breast in cross cradle hold. Initially has wide gape but does not sustain the latch once on the breast.  Moved to right breast in cross cradle with asymmetric latch. Infant latches and sucks several times before coming off crying. Laid back position tried without success. Nipple shield applied with instruction. Infant latches and pops off nipple. Talked with mother about pumping and donor milk this feeding. Mom states please lets get her fed.

## 2021-03-09 NOTE — PLAN OF CARE
Problem: Fluid Volume - Imbalance:  Goal: Absence of postpartum hemorrhage signs and symptoms  Description: Absence of postpartum hemorrhage signs and symptoms  3/9/2021 1100 by Carol Coronel RN  Outcome: Completed  3/8/2021 2132 by Sharda Michel RN  Outcome: Ongoing  Goal: Absence of imbalanced fluid volume signs and symptoms  Description: Absence of imbalanced fluid volume signs and symptoms  3/9/2021 1100 by Carol Coronel RN  Outcome: Completed  3/8/2021 2132 by Sharda Michel RN  Outcome: Ongoing     Problem: Pain - Acute:  Goal: Pain level will decrease  Description: Pain level will decrease  3/9/2021 1100 by Carol Coronel RN  Outcome: Completed  3/8/2021 2132 by Sharda Michel RN  Outcome: Ongoing     Problem: Discharge Planning:  Goal: Discharged to appropriate level of care  Description: Discharged to appropriate level of care  3/9/2021 1100 by Carol Coronel RN  Outcome: Completed  3/8/2021 2132 by Sharda Michel RN  Outcome: Ongoing     Problem: Infection - Surgical Site:  Goal: Will show no infection signs and symptoms  Description: Will show no infection signs and symptoms  3/9/2021 1100 by Carol Coronel RN  Outcome: Completed  3/8/2021 2132 by Sharda Michel RN  Outcome: Ongoing     Problem: Venous Thromboembolism:  Goal: Will show no signs or symptoms of venous thromboembolism  Description: Will show no signs or symptoms of venous thromboembolism  3/9/2021 1100 by Carol Coronel RN  Outcome: Completed  3/8/2021 2132 by Sharda Michel RN  Outcome: Ongoing  Goal: Absence of signs or symptoms of impaired coagulation  Description: Absence of signs or symptoms of impaired coagulation  3/9/2021 1100 by Carol Coronel RN  Outcome: Completed  3/8/2021 2132 by Sharda Michel RN  Outcome: Ongoing

## 2021-03-09 NOTE — LACTATION NOTE
This note was copied from a baby's chart. Mother given breastfeeding plan in anticipation of discharge this afternoon. Encouraged to come back for outpatient lactation tomorrow or Thursday. All questions answered. Encouraged pumping at least every three hours as she is working on latch. Parents awaiting arrival of ENT.

## 2023-09-19 ENCOUNTER — HOSPITAL ENCOUNTER (EMERGENCY)
Age: 33
Discharge: HOME | End: 2023-09-19
Payer: MEDICAID

## 2023-09-19 ENCOUNTER — APPOINTMENT (OUTPATIENT)
Dept: CT IMAGING | Age: 33
End: 2023-09-19
Payer: MEDICAID

## 2023-09-19 VITALS
HEIGHT: 61 IN | TEMPERATURE: 98.2 F | DIASTOLIC BLOOD PRESSURE: 73 MMHG | SYSTOLIC BLOOD PRESSURE: 122 MMHG | BODY MASS INDEX: 27 KG/M2 | OXYGEN SATURATION: 100 % | RESPIRATION RATE: 18 BRPM | HEART RATE: 88 BPM | WEIGHT: 143 LBS

## 2023-09-19 DIAGNOSIS — S09.90XA INJURY OF HEAD, INITIAL ENCOUNTER: Primary | ICD-10-CM
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
## 2023-09-19 PROCEDURE — 70450 CT HEAD/BRAIN W/O DYE: CPT

## 2023-09-19 PROCEDURE — 99284 EMERGENCY DEPT VISIT MOD MDM: CPT

## 2023-09-19 ASSESSMENT — PAIN DESCRIPTION - LOCATION: LOCATION: HEAD

## 2023-09-19 ASSESSMENT — PAIN SCALES - GENERAL: PAINLEVEL_OUTOF10: 5

## 2023-09-19 ASSESSMENT — ENCOUNTER SYMPTOMS
APNEA: 0
BACK PAIN: 0
ANAL BLEEDING: 0
VOICE CHANGE: 0
ABDOMINAL DISTENTION: 0
VOMITING: 0
ABDOMINAL PAIN: 0
NAUSEA: 0
EYE DISCHARGE: 0

## 2023-09-19 ASSESSMENT — PAIN - FUNCTIONAL ASSESSMENT: PAIN_FUNCTIONAL_ASSESSMENT: 0-10
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
## 2024-04-15 NOTE — BRIEF OP NOTE
Brief Postoperative Note      Patient: Wesly Driscoll  YOB: 1990  MRN: [id number]    Date of Procedure: 3/6/2021    Pre-Op Diagnosis: Fetal Intolerance of Labor. IUP 39 weeks. Diet controlled GDM. Meconium stained amniotic fluid. OP    Post-Op Diagnosis: Same with a viable female. Procedure(s):   SECTION  [facility]  Surgeon(s):  Aniceto Dawson DO    Assistant:  Dr Coppola Ear    Anesthesia: epidural    Estimated Blood Loss (mL): 946    Complications: None    Specimens:   * No specimens in log *    Implants:  * No implants in log *      Drains:   Urethral Catheter Non-latex 16 fr (Active)   $ Urethral catheter insertion Inserted for procedure 21 1006   Catheter Indications Other (specify) 21 1006   Urine Color Yellow 21 1006   Urine Appearance Clear 21 1006       Findings: viable female. TOB 2309.  Apgar 9/9. &#11 oz. 3494    Electronically signed by Aniceto Dawson DO on 3/6/2021 at 11:46 PM
[id number]:Routine|| ||00\01||False;

## (undated) DEVICE — DRESSING TELFA STRL 3X6

## (undated) DEVICE — SUTURE VCRL SZ 1 L36IN ABSRB UD L36MM CT-1 1/2 CIR J947H

## (undated) DEVICE — SUTURE VCRL SZ 4-0 L27IN ABSRB UD L60MM KS STR REV CUT NDL J662H

## (undated) DEVICE — SUTURE VCRL SZ 0 L36IN ABSRB VLT L36MM CT-1 1/2 CIR J346H

## (undated) DEVICE — STAPLER SKIN SQ 30 ABSRB STPL DISP INSORB

## (undated) DEVICE — SUTURE VCRL SZ 3-0 L36IN ABSRB VLT CT L40MM 1/2 CIR J356H

## (undated) DEVICE — STERILE NEOPRENE POWDER-FREE SURGICAL GLOVES WITH NITRILE COATING: Brand: PROTEXIS

## (undated) DEVICE — ASTOUND STANDARD SURGICAL GOWN, XL: Brand: CONVERTORS

## (undated) DEVICE — SYRINGE BULB EAR ULCER LF ST 1 OZ MEDICHOICE